# Patient Record
Sex: MALE | Race: WHITE | Employment: OTHER | URBAN - METROPOLITAN AREA
[De-identification: names, ages, dates, MRNs, and addresses within clinical notes are randomized per-mention and may not be internally consistent; named-entity substitution may affect disease eponyms.]

---

## 2017-03-10 ENCOUNTER — HOSPITAL ENCOUNTER (OUTPATIENT)
Dept: OTHER | Age: 72
Discharge: HOME | End: 2017-03-10
Attending: FAMILY MEDICINE | Admitting: FAMILY MEDICINE

## 2017-03-10 LAB
A/G RATIO: 1.6 (ref 1.1–2.2)
ALBUMIN SERPL-MCNC: 4.1 G/DL (ref 3.4–5)
ALP BLD-CCNC: 50 U/L (ref 40–129)
ALT SERPL-CCNC: 9 U/L (ref 10–40)
ANION GAP SERPL CALCULATED.3IONS-SCNC: 13 MMOL/L (ref 3–16)
AST SERPL-CCNC: 14 U/L (ref 15–37)
BILIRUB SERPL-MCNC: 0.6 MG/DL (ref 0–1)
BUN BLDV-MCNC: 9 MG/DL (ref 7–20)
CALCIUM SERPL-MCNC: 8.8 MG/DL (ref 8.3–10.6)
CHLORIDE BLD-SCNC: 95 MMOL/L (ref 99–110)
CHOLESTEROL, TOTAL: 171 MG/DL (ref 0–199)
CO2: 26 MMOL/L (ref 21–32)
CREAT SERPL-MCNC: 0.7 MG/DL (ref 0.8–1.3)
GFR AFRICAN AMERICAN: >60
GFR NON-AFRICAN AMERICAN: >60
GLOBULIN: 2.5 G/DL
GLUCOSE BLD-MCNC: 91 MG/DL (ref 70–99)
HDLC SERPL-MCNC: 57 MG/DL (ref 40–60)
LDL CHOLESTEROL CALCULATED: 102 MG/DL
POTASSIUM SERPL-SCNC: 4 MMOL/L (ref 3.5–5.1)
SODIUM BLD-SCNC: 134 MMOL/L (ref 136–145)
TOTAL PROTEIN: 6.6 G/DL (ref 6.4–8.2)
TRIGL SERPL-MCNC: 61 MG/DL (ref 0–150)
VLDLC SERPL CALC-MCNC: 12 MG/DL

## 2017-03-20 ENCOUNTER — OFFICE VISIT (OUTPATIENT)
Dept: FAMILY MEDICINE CLINIC | Age: 72
End: 2017-03-20

## 2017-03-20 VITALS
BODY MASS INDEX: 30.87 KG/M2 | RESPIRATION RATE: 12 BRPM | WEIGHT: 215.13 LBS | SYSTOLIC BLOOD PRESSURE: 134 MMHG | OXYGEN SATURATION: 98 % | HEART RATE: 87 BPM | DIASTOLIC BLOOD PRESSURE: 72 MMHG

## 2017-03-20 DIAGNOSIS — I10 ESSENTIAL HYPERTENSION: ICD-10-CM

## 2017-03-20 DIAGNOSIS — G45.9 TRANSIENT CEREBRAL ISCHEMIA, UNSPECIFIED TYPE: ICD-10-CM

## 2017-03-20 DIAGNOSIS — N40.1 BENIGN NON-NODULAR PROSTATIC HYPERPLASIA WITH LOWER URINARY TRACT SYMPTOMS: ICD-10-CM

## 2017-03-20 DIAGNOSIS — E78.00 HYPERCHOLESTEROLEMIA: Primary | ICD-10-CM

## 2017-03-20 PROCEDURE — 1123F ACP DISCUSS/DSCN MKR DOCD: CPT | Performed by: FAMILY MEDICINE

## 2017-03-20 PROCEDURE — G8417 CALC BMI ABV UP PARAM F/U: HCPCS | Performed by: FAMILY MEDICINE

## 2017-03-20 PROCEDURE — 99214 OFFICE O/P EST MOD 30 MIN: CPT | Performed by: FAMILY MEDICINE

## 2017-03-20 PROCEDURE — 3017F COLORECTAL CA SCREEN DOC REV: CPT | Performed by: FAMILY MEDICINE

## 2017-03-20 PROCEDURE — 4040F PNEUMOC VAC/ADMIN/RCVD: CPT | Performed by: FAMILY MEDICINE

## 2017-03-20 PROCEDURE — G8427 DOCREV CUR MEDS BY ELIG CLIN: HCPCS | Performed by: FAMILY MEDICINE

## 2017-03-20 PROCEDURE — G8484 FLU IMMUNIZE NO ADMIN: HCPCS | Performed by: FAMILY MEDICINE

## 2017-03-20 PROCEDURE — 1036F TOBACCO NON-USER: CPT | Performed by: FAMILY MEDICINE

## 2017-03-20 RX ORDER — TRIAMTERENE AND HYDROCHLOROTHIAZIDE 37.5; 25 MG/1; MG/1
1 TABLET ORAL DAILY
Qty: 90 TABLET | Refills: 3 | Status: SHIPPED | OUTPATIENT
Start: 2017-03-20 | End: 2018-03-27 | Stop reason: SDUPTHER

## 2017-03-20 RX ORDER — DOXAZOSIN 2 MG/1
2 TABLET ORAL DAILY
Qty: 90 TABLET | Refills: 3 | Status: SHIPPED | OUTPATIENT
Start: 2017-03-20 | End: 2018-03-27 | Stop reason: SDUPTHER

## 2017-03-20 RX ORDER — LISINOPRIL 2.5 MG/1
2.5 TABLET ORAL DAILY
Qty: 90 TABLET | Refills: 3 | Status: SHIPPED | OUTPATIENT
Start: 2017-03-20 | End: 2018-03-27 | Stop reason: SDUPTHER

## 2017-03-22 ENCOUNTER — OFFICE VISIT (OUTPATIENT)
Dept: FAMILY MEDICINE CLINIC | Age: 72
End: 2017-03-22

## 2017-03-22 VITALS
SYSTOLIC BLOOD PRESSURE: 130 MMHG | BODY MASS INDEX: 30.85 KG/M2 | DIASTOLIC BLOOD PRESSURE: 80 MMHG | WEIGHT: 215 LBS | TEMPERATURE: 98.1 F

## 2017-03-22 DIAGNOSIS — R59.1 LYMPHADENOPATHY OF HEAD AND NECK: Primary | ICD-10-CM

## 2017-03-22 PROCEDURE — 3017F COLORECTAL CA SCREEN DOC REV: CPT | Performed by: PHYSICIAN ASSISTANT

## 2017-03-22 PROCEDURE — 1123F ACP DISCUSS/DSCN MKR DOCD: CPT | Performed by: PHYSICIAN ASSISTANT

## 2017-03-22 PROCEDURE — 1036F TOBACCO NON-USER: CPT | Performed by: PHYSICIAN ASSISTANT

## 2017-03-22 PROCEDURE — 99213 OFFICE O/P EST LOW 20 MIN: CPT | Performed by: PHYSICIAN ASSISTANT

## 2017-03-22 PROCEDURE — G8427 DOCREV CUR MEDS BY ELIG CLIN: HCPCS | Performed by: PHYSICIAN ASSISTANT

## 2017-03-22 PROCEDURE — G8484 FLU IMMUNIZE NO ADMIN: HCPCS | Performed by: PHYSICIAN ASSISTANT

## 2017-03-22 PROCEDURE — G8417 CALC BMI ABV UP PARAM F/U: HCPCS | Performed by: PHYSICIAN ASSISTANT

## 2017-03-22 PROCEDURE — 4040F PNEUMOC VAC/ADMIN/RCVD: CPT | Performed by: PHYSICIAN ASSISTANT

## 2017-03-22 RX ORDER — AZITHROMYCIN 250 MG/1
TABLET, FILM COATED ORAL
Qty: 1 PACKET | Refills: 0 | Status: SHIPPED | OUTPATIENT
Start: 2017-03-22 | End: 2017-12-29 | Stop reason: SDUPTHER

## 2017-03-22 ASSESSMENT — ENCOUNTER SYMPTOMS
WHEEZING: 0
SHORTNESS OF BREATH: 0
SORE THROAT: 0
COUGH: 0

## 2017-09-13 ENCOUNTER — HOSPITAL ENCOUNTER (OUTPATIENT)
Dept: OTHER | Age: 72
Discharge: HOME | End: 2017-09-13
Attending: FAMILY MEDICINE | Admitting: FAMILY MEDICINE

## 2017-09-13 LAB
A/G RATIO: 1.3 (ref 1.1–2.2)
ALBUMIN SERPL-MCNC: 3.7 G/DL (ref 3.4–5)
ALP BLD-CCNC: 43 U/L (ref 40–129)
ALT SERPL-CCNC: 9 U/L (ref 10–40)
ANION GAP SERPL CALCULATED.3IONS-SCNC: 14 MMOL/L (ref 3–16)
AST SERPL-CCNC: 14 U/L (ref 15–37)
BILIRUB SERPL-MCNC: 0.5 MG/DL (ref 0–1)
BUN BLDV-MCNC: 10 MG/DL (ref 7–20)
CALCIUM SERPL-MCNC: 8.7 MG/DL (ref 8.3–10.6)
CHLORIDE BLD-SCNC: 98 MMOL/L (ref 99–110)
CHOLESTEROL, TOTAL: 168 MG/DL (ref 0–199)
CO2: 24 MMOL/L (ref 21–32)
CREAT SERPL-MCNC: 0.7 MG/DL (ref 0.8–1.3)
GFR AFRICAN AMERICAN: >60
GFR NON-AFRICAN AMERICAN: >60
GLOBULIN: 2.8 G/DL
GLUCOSE BLD-MCNC: 92 MG/DL (ref 70–99)
HDLC SERPL-MCNC: 54 MG/DL (ref 40–60)
LDL CHOLESTEROL CALCULATED: 98 MG/DL
POTASSIUM SERPL-SCNC: 4 MMOL/L (ref 3.5–5.1)
PROSTATE SPECIFIC ANTIGEN: 0.56 NG/ML (ref 0–4)
SODIUM BLD-SCNC: 136 MMOL/L (ref 136–145)
TOTAL PROTEIN: 6.5 G/DL (ref 6.4–8.2)
TRIGL SERPL-MCNC: 78 MG/DL (ref 0–150)
VLDLC SERPL CALC-MCNC: 16 MG/DL

## 2017-09-27 ENCOUNTER — OFFICE VISIT (OUTPATIENT)
Dept: FAMILY MEDICINE CLINIC | Age: 72
End: 2017-09-27

## 2017-09-27 VITALS
HEART RATE: 71 BPM | HEIGHT: 71 IN | DIASTOLIC BLOOD PRESSURE: 84 MMHG | SYSTOLIC BLOOD PRESSURE: 124 MMHG | OXYGEN SATURATION: 98 % | RESPIRATION RATE: 12 BRPM | BODY MASS INDEX: 29.31 KG/M2 | WEIGHT: 209.38 LBS

## 2017-09-27 DIAGNOSIS — G45.9 TRANSIENT CEREBRAL ISCHEMIA, UNSPECIFIED TYPE: ICD-10-CM

## 2017-09-27 DIAGNOSIS — E78.00 HYPERCHOLESTEROLEMIA: ICD-10-CM

## 2017-09-27 DIAGNOSIS — Z23 NEED FOR INFLUENZA VACCINATION: ICD-10-CM

## 2017-09-27 DIAGNOSIS — M15.9 PRIMARY OSTEOARTHRITIS INVOLVING MULTIPLE JOINTS: ICD-10-CM

## 2017-09-27 DIAGNOSIS — I10 ESSENTIAL HYPERTENSION: Primary | ICD-10-CM

## 2017-09-27 PROCEDURE — 99214 OFFICE O/P EST MOD 30 MIN: CPT | Performed by: FAMILY MEDICINE

## 2017-09-27 PROCEDURE — 3017F COLORECTAL CA SCREEN DOC REV: CPT | Performed by: FAMILY MEDICINE

## 2017-09-27 PROCEDURE — G8419 CALC BMI OUT NRM PARAM NOF/U: HCPCS | Performed by: FAMILY MEDICINE

## 2017-09-27 PROCEDURE — G0008 ADMIN INFLUENZA VIRUS VAC: HCPCS | Performed by: FAMILY MEDICINE

## 2017-09-27 PROCEDURE — 4040F PNEUMOC VAC/ADMIN/RCVD: CPT | Performed by: FAMILY MEDICINE

## 2017-09-27 PROCEDURE — 1036F TOBACCO NON-USER: CPT | Performed by: FAMILY MEDICINE

## 2017-09-27 PROCEDURE — 90662 IIV NO PRSV INCREASED AG IM: CPT | Performed by: FAMILY MEDICINE

## 2017-09-27 PROCEDURE — 1123F ACP DISCUSS/DSCN MKR DOCD: CPT | Performed by: FAMILY MEDICINE

## 2017-09-27 PROCEDURE — G8427 DOCREV CUR MEDS BY ELIG CLIN: HCPCS | Performed by: FAMILY MEDICINE

## 2017-09-27 ASSESSMENT — PATIENT HEALTH QUESTIONNAIRE - PHQ9
SUM OF ALL RESPONSES TO PHQ QUESTIONS 1-9: 0
SUM OF ALL RESPONSES TO PHQ9 QUESTIONS 1 & 2: 0
1. LITTLE INTEREST OR PLEASURE IN DOING THINGS: 0
2. FEELING DOWN, DEPRESSED OR HOPELESS: 0

## 2017-09-27 NOTE — PROGRESS NOTES
Subjective:      Patient ID: Enio Barclay is a 71 y.o. male. HPI Patient presents for re-evaluation of chronic health problems. Pt states he has no concerns at this time. Patient's back pain is relatively unchanged. He has good days and bad days. He has noted particular with standing with his back bent that he will have discomfort. He typically doesn't take any medications for these problems. Patient denies any new neurologic changes. Patient is very compliant with medications with no adverse reactions. Patient denies any chest pain or shortness of breath symptoms. Urinary symptoms are controlled with having nocturia x1    Review of Systems  Patient Active Problem List   Diagnosis    TIA (transient ischemic attack)    Hypertension    Benign non-nodular prostatic hyperplasia with lower urinary tract symptoms    Hypercholesterolemia       Outpatient Prescriptions Marked as Taking for the 9/27/17 encounter (Office Visit) with Samreen Madsen MD   Medication Sig Dispense Refill    lisinopril (PRINIVIL;ZESTRIL) 2.5 MG tablet Take 1 tablet by mouth daily 90 tablet 3    triamterene-hydrochlorothiazide (MAXZIDE-25) 37.5-25 MG per tablet Take 1 tablet by mouth daily 90 tablet 3    doxazosin (CARDURA) 2 MG tablet Take 1 tablet by mouth daily 90 tablet 3    Red Yeast Rice Extract 600 MG CAPS Take 2 capsules by mouth daily.  aspirin 81 MG EC tablet Take 81 mg by mouth daily.            Allergies   Allergen Reactions    Shellfish-Derived Products Swelling     CRAB       Social History   Substance Use Topics    Smoking status: Former Smoker     Packs/day: 0.50     Years: 50.00    Smokeless tobacco: Never Used    Alcohol use 25.2 oz/week     42 Cans of beer per week      Comment: 6 beers daily       /84 (Site: Right Arm, Position: Sitting, Cuff Size: Medium Adult)  Pulse 71  Resp 12  Ht 5' 10.5\" (1.791 m)  Wt 209 lb 6 oz (95 kg)  SpO2 98%  BMI 29.62 kg/m2    Objective:   Physical Exam regard to his osteoarthritis of spine clinically    Patient received counseling on the following healthy behaviors: nutrition and exercise     Patient given educational materials     Discussed use, benefit, and side effects of prescribed medications. Barriers to medication compliance addressed. All patient questions answered. Pt voiced understanding. Patient needs RTC in 6 months. Please note that this chart was generated using Dragon dictation software. Although every effort was made to ensure the accuracy of this automated transcription, some errors in transcription may have occurred.

## 2017-09-27 NOTE — MR AVS SNAPSHOT
type 2 diabetes, stroke, gallstones, arthritis, sleep apnea, and certain cancers. BMI is not perfect. It may overestimate body fat in athletes and people who are more muscular. If your body fat is high you can improve your BMI by decreasing your calorie intake and becoming more physically active. Learn more at: [employer]             Medications and Orders      Your Current Medications Are              lisinopril (PRINIVIL;ZESTRIL) 2.5 MG tablet Take 1 tablet by mouth daily    triamterene-hydrochlorothiazide (MAXZIDE-25) 37.5-25 MG per tablet Take 1 tablet by mouth daily    doxazosin (CARDURA) 2 MG tablet Take 1 tablet by mouth daily    Red Yeast Rice Extract 600 MG CAPS Take 2 capsules by mouth daily. aspirin 81 MG EC tablet Take 81 mg by mouth daily.         Allergies              Shellfish-derived Products Swelling    CRAB         Additional Information        Basic Information     Date Of Birth Sex Race Ethnicity Preferred Language    1945 Male White Non-/Non  English      Problem List as of 9/27/2017  Date Reviewed: 3/22/2017                Hypercholesterolemia    Benign non-nodular prostatic hyperplasia with lower urinary tract symptoms    TIA (transient ischemic attack)    Hypertension      Immunizations as of 9/27/2017     Name Date    Influenza Vaccine, unspecified formulation 9/30/2015, 11/18/2014    Influenza Virus Vaccine 9/10/2012    Influenza, High Dose 9/21/2016, 9/30/2015, 9/25/2013    Pneumococcal 13-valent Conjugate (Chpvjwe79) 3/16/2015    Pneumococcal Polysaccharide (Rjezifool21) 3/25/2014    Tdap (Boostrix, Adacel) 3/8/2010      Preventive Care        Date Due    One-time abdominal aortic aneurism (AAA) screening is recommended for all men between the age of 73-68 who have ever smoked 1945    Hepatitis C screening is recommended for all adults regardless of risk

## 2017-10-06 PROBLEM — M15.0 PRIMARY OSTEOARTHRITIS INVOLVING MULTIPLE JOINTS: Status: ACTIVE | Noted: 2017-10-06

## 2017-10-06 PROBLEM — M15.9 PRIMARY OSTEOARTHRITIS INVOLVING MULTIPLE JOINTS: Status: ACTIVE | Noted: 2017-10-06

## 2017-12-29 ENCOUNTER — OFFICE VISIT (OUTPATIENT)
Dept: FAMILY MEDICINE CLINIC | Age: 72
End: 2017-12-29

## 2017-12-29 VITALS
SYSTOLIC BLOOD PRESSURE: 120 MMHG | TEMPERATURE: 98.6 F | DIASTOLIC BLOOD PRESSURE: 82 MMHG | OXYGEN SATURATION: 99 % | HEART RATE: 86 BPM

## 2017-12-29 DIAGNOSIS — J20.9 ACUTE BRONCHITIS, UNSPECIFIED ORGANISM: Primary | ICD-10-CM

## 2017-12-29 PROCEDURE — 1036F TOBACCO NON-USER: CPT | Performed by: FAMILY MEDICINE

## 2017-12-29 PROCEDURE — G8484 FLU IMMUNIZE NO ADMIN: HCPCS | Performed by: FAMILY MEDICINE

## 2017-12-29 PROCEDURE — G8428 CUR MEDS NOT DOCUMENT: HCPCS | Performed by: FAMILY MEDICINE

## 2017-12-29 PROCEDURE — 1123F ACP DISCUSS/DSCN MKR DOCD: CPT | Performed by: FAMILY MEDICINE

## 2017-12-29 PROCEDURE — 4040F PNEUMOC VAC/ADMIN/RCVD: CPT | Performed by: FAMILY MEDICINE

## 2017-12-29 PROCEDURE — 3017F COLORECTAL CA SCREEN DOC REV: CPT | Performed by: FAMILY MEDICINE

## 2017-12-29 PROCEDURE — G8419 CALC BMI OUT NRM PARAM NOF/U: HCPCS | Performed by: FAMILY MEDICINE

## 2017-12-29 PROCEDURE — 99213 OFFICE O/P EST LOW 20 MIN: CPT | Performed by: FAMILY MEDICINE

## 2017-12-29 RX ORDER — AZITHROMYCIN 250 MG/1
TABLET, FILM COATED ORAL
Qty: 1 PACKET | Refills: 0 | Status: SHIPPED | OUTPATIENT
Start: 2017-12-29 | End: 2018-06-01 | Stop reason: SDUPTHER

## 2017-12-29 ASSESSMENT — ENCOUNTER SYMPTOMS
RHINORRHEA: 1
COUGH: 1
SORE THROAT: 1

## 2017-12-29 NOTE — PROGRESS NOTES
Subjective:      Patient ID: Rose Marie Beatty is a 71 y.o. male. Patient presents for acute medical problem. Medical assistant notes reviewed. URI    This is a new problem. Episode onset: about 8 days. There has been no fever. Associated symptoms include congestion, coughing, rhinorrhea, sneezing and a sore throat. Associated symptoms comments: Hoarseness, post nasal drip. Treatments tried: Coricidin HBP. Patient feels should be getting better and he has not. A lot congestion and drainage but is been having a lot of cough that keeps him awake at nighttime. He does use humidifier. Review of Systems   HENT: Positive for congestion, rhinorrhea, sneezing and sore throat. Respiratory: Positive for cough. Allergies   Allergen Reactions    Shellfish-Derived Products Swelling     CRAB         Objective:   Physical Exam   Constitutional: He appears well-developed and well-nourished. He is cooperative. HENT:   Right Ear: Tympanic membrane and ear canal normal.   Left Ear: Tympanic membrane and ear canal normal.   Nose: Mucosal edema and rhinorrhea present. Right sinus exhibits no maxillary sinus tenderness and no frontal sinus tenderness. Left sinus exhibits no maxillary sinus tenderness and no frontal sinus tenderness. Mouth/Throat: Oropharynx is clear and moist and mucous membranes are normal.   Neck: Neck supple. Pulmonary/Chest: Effort normal. No respiratory distress. He has no decreased breath sounds. He has no wheezes. He has rhonchi (diffuse). Lymphadenopathy:     He has no cervical adenopathy. Neurological: He is alert. Assessment:      Santo Jain was seen today for uri. Diagnoses and all orders for this visit:    Acute bronchitis, unspecified organism    Other orders  -     azithromycin (ZITHROMAX) 250 MG tablet; Take 2 tabs (500 mg) on Day 1, and take 1 tab (250 mg) on days 2 through 5. Plan:      Humidification of the bedroom was discussed.   RTC when necessary

## 2018-03-09 ENCOUNTER — TELEPHONE (OUTPATIENT)
Dept: FAMILY MEDICINE CLINIC | Age: 73
End: 2018-03-09

## 2018-03-09 DIAGNOSIS — N40.1 BENIGN NON-NODULAR PROSTATIC HYPERPLASIA WITH LOWER URINARY TRACT SYMPTOMS: Primary | ICD-10-CM

## 2018-03-09 DIAGNOSIS — I10 ESSENTIAL HYPERTENSION: ICD-10-CM

## 2018-03-09 NOTE — TELEPHONE ENCOUNTER
Lab orders requested- 6 mth HTN R/CK    Physical Scheduled Date: 3/27/18     Last Physical Date: 3-16-15 (physical) LOV- 12-29-17- same day    Physician Scheduled with: Dr. Phong Alford    Patient will be using [facility] Lab : place orders in chart-    No: fax orders to:N/A patient will  orders YES- Call Pt when ready please    Be sure to tell the patient to fast 10 to 12 hours before having their blood draw. ALSO    FYI to MD- Pt received Call on AWV- Pt has declined to have this visit due to upcoming visit on 3/27.

## 2018-03-12 NOTE — TELEPHONE ENCOUNTER
[facility]'t [facility] to  lab orders here. Lab orders put in Epic.

## 2018-03-19 ENCOUNTER — HOSPITAL ENCOUNTER (OUTPATIENT)
Dept: OTHER | Age: 73
Discharge: HOME | End: 2018-03-19
Attending: FAMILY MEDICINE | Admitting: FAMILY MEDICINE

## 2018-03-19 DIAGNOSIS — I10 ESSENTIAL HYPERTENSION: ICD-10-CM

## 2018-03-19 DIAGNOSIS — N40.1 BENIGN NON-NODULAR PROSTATIC HYPERPLASIA WITH LOWER URINARY TRACT SYMPTOMS: ICD-10-CM

## 2018-03-19 LAB
A/G RATIO: 1.6 (ref 1.1–2.2)
ALBUMIN SERPL-MCNC: 3.9 G/DL (ref 3.4–5)
ALP BLD-CCNC: 43 U/L (ref 40–129)
ALT SERPL-CCNC: 11 U/L (ref 10–40)
ANION GAP SERPL CALCULATED.3IONS-SCNC: 11 MMOL/L (ref 3–16)
AST SERPL-CCNC: 23 U/L (ref 15–37)
BILIRUB SERPL-MCNC: 0.4 MG/DL (ref 0–1)
BUN BLDV-MCNC: 9 MG/DL (ref 7–20)
CALCIUM SERPL-MCNC: 8.2 MG/DL (ref 8.3–10.6)
CHLORIDE BLD-SCNC: 99 MMOL/L (ref 99–110)
CHOLESTEROL, TOTAL: 157 MG/DL (ref 0–199)
CO2: 27 MMOL/L (ref 21–32)
CREAT SERPL-MCNC: 0.6 MG/DL (ref 0.8–1.3)
GFR AFRICAN AMERICAN: >60
GFR NON-AFRICAN AMERICAN: >60
GLOBULIN: 2.5 G/DL
GLUCOSE BLD-MCNC: 95 MG/DL (ref 70–99)
HDLC SERPL-MCNC: 67 MG/DL (ref 40–60)
LDL CHOLESTEROL CALCULATED: 81 MG/DL
POTASSIUM SERPL-SCNC: 4.5 MMOL/L (ref 3.5–5.1)
PROSTATE SPECIFIC ANTIGEN: 0.66 NG/ML (ref 0–4)
SODIUM BLD-SCNC: 137 MMOL/L (ref 136–145)
TOTAL PROTEIN: 6.4 G/DL (ref 6.4–8.2)
TRIGL SERPL-MCNC: 44 MG/DL (ref 0–150)
VLDLC SERPL CALC-MCNC: 9 MG/DL

## 2018-03-27 ENCOUNTER — OFFICE VISIT (OUTPATIENT)
Dept: FAMILY MEDICINE CLINIC | Age: 73
End: 2018-03-27

## 2018-03-27 VITALS
HEIGHT: 71 IN | BODY MASS INDEX: 29.56 KG/M2 | OXYGEN SATURATION: 98 % | WEIGHT: 211.13 LBS | HEART RATE: 82 BPM | RESPIRATION RATE: 12 BRPM | SYSTOLIC BLOOD PRESSURE: 130 MMHG | DIASTOLIC BLOOD PRESSURE: 74 MMHG

## 2018-03-27 DIAGNOSIS — E78.00 HYPERCHOLESTEROLEMIA: ICD-10-CM

## 2018-03-27 DIAGNOSIS — N40.1 BENIGN NON-NODULAR PROSTATIC HYPERPLASIA WITH LOWER URINARY TRACT SYMPTOMS: ICD-10-CM

## 2018-03-27 DIAGNOSIS — M15.9 PRIMARY OSTEOARTHRITIS INVOLVING MULTIPLE JOINTS: ICD-10-CM

## 2018-03-27 DIAGNOSIS — G45.9 TRANSIENT CEREBRAL ISCHEMIA, UNSPECIFIED TYPE: ICD-10-CM

## 2018-03-27 DIAGNOSIS — I10 ESSENTIAL HYPERTENSION: Primary | ICD-10-CM

## 2018-03-27 PROCEDURE — 99214 OFFICE O/P EST MOD 30 MIN: CPT | Performed by: FAMILY MEDICINE

## 2018-03-27 PROCEDURE — 1036F TOBACCO NON-USER: CPT | Performed by: FAMILY MEDICINE

## 2018-03-27 PROCEDURE — 3017F COLORECTAL CA SCREEN DOC REV: CPT | Performed by: FAMILY MEDICINE

## 2018-03-27 PROCEDURE — G8427 DOCREV CUR MEDS BY ELIG CLIN: HCPCS | Performed by: FAMILY MEDICINE

## 2018-03-27 PROCEDURE — G8419 CALC BMI OUT NRM PARAM NOF/U: HCPCS | Performed by: FAMILY MEDICINE

## 2018-03-27 PROCEDURE — G8482 FLU IMMUNIZE ORDER/ADMIN: HCPCS | Performed by: FAMILY MEDICINE

## 2018-03-27 PROCEDURE — 1123F ACP DISCUSS/DSCN MKR DOCD: CPT | Performed by: FAMILY MEDICINE

## 2018-03-27 PROCEDURE — 4040F PNEUMOC VAC/ADMIN/RCVD: CPT | Performed by: FAMILY MEDICINE

## 2018-03-27 RX ORDER — TRIAMTERENE AND HYDROCHLOROTHIAZIDE 37.5; 25 MG/1; MG/1
1 TABLET ORAL DAILY
Qty: 90 TABLET | Refills: 3 | Status: SHIPPED | OUTPATIENT
Start: 2018-03-27 | End: 2019-03-04 | Stop reason: SDUPTHER

## 2018-03-27 RX ORDER — LISINOPRIL 2.5 MG/1
2.5 TABLET ORAL DAILY
Qty: 90 TABLET | Refills: 3 | Status: SHIPPED | OUTPATIENT
Start: 2018-03-27 | End: 2019-03-04 | Stop reason: SDUPTHER

## 2018-03-27 RX ORDER — DOXAZOSIN 2 MG/1
2 TABLET ORAL DAILY
Qty: 90 TABLET | Refills: 3 | Status: SHIPPED | OUTPATIENT
Start: 2018-03-27 | End: 2019-03-04 | Stop reason: SDUPTHER

## 2018-03-27 NOTE — PROGRESS NOTES
BMI 29.87 kg/m²     Objective:   Physical Exam    Assessment:      Iman Lucas was seen today for follow-up. Diagnoses and all orders for this visit:    Essential hypertension  -     lisinopril (PRINIVIL;ZESTRIL) 2.5 MG tablet; Take 1 tablet by mouth daily  -     triamterene-hydrochlorothiazide (MAXZIDE-25) 37.5-25 MG per tablet; Take 1 tablet by mouth daily    Transient cerebral ischemia, unspecified type    Benign non-nodular prostatic hyperplasia with lower urinary tract symptoms    Hypercholesterolemia    Primary osteoarthritis involving multiple joints    Other orders  -     doxazosin (CARDURA) 2 MG tablet; Take 1 tablet by mouth daily            Plan:      Pt appears stable & reviewed labs with patient. Patient does not want to be on statin medications. No change her current medication. Patient received counseling on the following healthy behaviors: nutrition and exercise     Patient given educational materials     Health maintenance updated    Discussed use, benefit, and side effects of prescribed medications. Barriers to medication compliance addressed. All patient questions answered. Pt voiced understanding. Patient needs RTC in 6 months. Please note that this chart was generated using Dragon dictation software. Although every effort was made to ensure the accuracy of this automated transcription, some errors in transcription may have occurred.

## 2018-06-01 ENCOUNTER — OFFICE VISIT (OUTPATIENT)
Dept: FAMILY MEDICINE CLINIC | Age: 73
End: 2018-06-01

## 2018-06-01 VITALS
WEIGHT: 206.8 LBS | DIASTOLIC BLOOD PRESSURE: 80 MMHG | HEART RATE: 85 BPM | OXYGEN SATURATION: 98 % | BODY MASS INDEX: 29.25 KG/M2 | SYSTOLIC BLOOD PRESSURE: 126 MMHG | TEMPERATURE: 97.9 F

## 2018-06-01 DIAGNOSIS — J20.9 ACUTE BRONCHITIS, UNSPECIFIED ORGANISM: ICD-10-CM

## 2018-06-01 DIAGNOSIS — H66.012 ACUTE SUPPURATIVE OTITIS MEDIA OF LEFT EAR WITH SPONTANEOUS RUPTURE OF TYMPANIC MEMBRANE, RECURRENCE NOT SPECIFIED: Primary | ICD-10-CM

## 2018-06-01 PROCEDURE — 99213 OFFICE O/P EST LOW 20 MIN: CPT | Performed by: FAMILY MEDICINE

## 2018-06-01 PROCEDURE — G8419 CALC BMI OUT NRM PARAM NOF/U: HCPCS | Performed by: FAMILY MEDICINE

## 2018-06-01 PROCEDURE — 3017F COLORECTAL CA SCREEN DOC REV: CPT | Performed by: FAMILY MEDICINE

## 2018-06-01 PROCEDURE — 4040F PNEUMOC VAC/ADMIN/RCVD: CPT | Performed by: FAMILY MEDICINE

## 2018-06-01 PROCEDURE — 1036F TOBACCO NON-USER: CPT | Performed by: FAMILY MEDICINE

## 2018-06-01 PROCEDURE — G8428 CUR MEDS NOT DOCUMENT: HCPCS | Performed by: FAMILY MEDICINE

## 2018-06-01 PROCEDURE — 1123F ACP DISCUSS/DSCN MKR DOCD: CPT | Performed by: FAMILY MEDICINE

## 2018-06-01 RX ORDER — AZITHROMYCIN 250 MG/1
TABLET, FILM COATED ORAL
Qty: 1 PACKET | Refills: 0 | Status: SHIPPED | OUTPATIENT
Start: 2018-06-01 | End: 2018-06-11

## 2018-06-07 ENCOUNTER — TELEPHONE (OUTPATIENT)
Dept: FAMILY MEDICINE CLINIC | Age: 73
End: 2018-06-07

## 2018-06-14 ENCOUNTER — OFFICE VISIT (OUTPATIENT)
Dept: FAMILY MEDICINE CLINIC | Age: 73
End: 2018-06-14

## 2018-06-14 VITALS
DIASTOLIC BLOOD PRESSURE: 80 MMHG | BODY MASS INDEX: 29.23 KG/M2 | SYSTOLIC BLOOD PRESSURE: 122 MMHG | HEART RATE: 79 BPM | OXYGEN SATURATION: 98 % | WEIGHT: 206.6 LBS

## 2018-06-14 DIAGNOSIS — H66.012 ACUTE SUPPURATIVE OTITIS MEDIA OF LEFT EAR WITH SPONTANEOUS RUPTURE OF TYMPANIC MEMBRANE, RECURRENCE NOT SPECIFIED: Primary | ICD-10-CM

## 2018-06-14 PROCEDURE — G8419 CALC BMI OUT NRM PARAM NOF/U: HCPCS | Performed by: FAMILY MEDICINE

## 2018-06-14 PROCEDURE — 3017F COLORECTAL CA SCREEN DOC REV: CPT | Performed by: FAMILY MEDICINE

## 2018-06-14 PROCEDURE — G8428 CUR MEDS NOT DOCUMENT: HCPCS | Performed by: FAMILY MEDICINE

## 2018-06-14 PROCEDURE — 99213 OFFICE O/P EST LOW 20 MIN: CPT | Performed by: FAMILY MEDICINE

## 2018-06-14 PROCEDURE — 1123F ACP DISCUSS/DSCN MKR DOCD: CPT | Performed by: FAMILY MEDICINE

## 2018-06-14 PROCEDURE — 1036F TOBACCO NON-USER: CPT | Performed by: FAMILY MEDICINE

## 2018-06-14 PROCEDURE — 4040F PNEUMOC VAC/ADMIN/RCVD: CPT | Performed by: FAMILY MEDICINE

## 2018-06-14 RX ORDER — LEVOFLOXACIN 500 MG/1
500 TABLET, FILM COATED ORAL DAILY
Qty: 10 TABLET | Refills: 0 | Status: SHIPPED | OUTPATIENT
Start: 2018-06-14 | End: 2018-06-24

## 2018-06-21 ENCOUNTER — TELEPHONE (OUTPATIENT)
Dept: ENT CLINIC | Age: 73
End: 2018-06-21

## 2018-06-22 ENCOUNTER — OFFICE VISIT (OUTPATIENT)
Dept: ENT CLINIC | Age: 73
End: 2018-06-22

## 2018-06-22 VITALS
BODY MASS INDEX: 29.28 KG/M2 | HEART RATE: 71 BPM | DIASTOLIC BLOOD PRESSURE: 75 MMHG | WEIGHT: 207 LBS | SYSTOLIC BLOOD PRESSURE: 123 MMHG

## 2018-06-22 DIAGNOSIS — H91.22 HEARING LOSS, SUDDEN, LEFT: Primary | ICD-10-CM

## 2018-06-22 PROCEDURE — 99203 OFFICE O/P NEW LOW 30 MIN: CPT | Performed by: OTOLARYNGOLOGY

## 2018-06-22 PROCEDURE — 4040F PNEUMOC VAC/ADMIN/RCVD: CPT | Performed by: OTOLARYNGOLOGY

## 2018-06-22 PROCEDURE — 1036F TOBACCO NON-USER: CPT | Performed by: OTOLARYNGOLOGY

## 2018-06-22 PROCEDURE — G8427 DOCREV CUR MEDS BY ELIG CLIN: HCPCS | Performed by: OTOLARYNGOLOGY

## 2018-06-22 PROCEDURE — 1123F ACP DISCUSS/DSCN MKR DOCD: CPT | Performed by: OTOLARYNGOLOGY

## 2018-06-22 PROCEDURE — 3017F COLORECTAL CA SCREEN DOC REV: CPT | Performed by: OTOLARYNGOLOGY

## 2018-06-22 PROCEDURE — G8419 CALC BMI OUT NRM PARAM NOF/U: HCPCS | Performed by: OTOLARYNGOLOGY

## 2018-06-22 NOTE — PROGRESS NOTES
[facility]       NEW PATIENT VISIT    PCP:  Kathie Ruiz MD    REFERRED BY:   Kathie Ruiz MD      CHIEF COMPLAINT:  Chief Complaint   Patient presents with    Ear Problem       HISTORY OF PRESENT ILLNESS:       (Location, Quality, Severity, Duration, Timing, Context, Modifying factors, Associated symptoms)  Jase Mcpherson is a 71 y.o. male here for evaluation of a problem located in the left ear. Sudden hearing loss June 1, or June 2. Hearing loss was severe. Left ear. Saw Dr. Donna Colunga a few days after onset, and when took hearing aid out at the doc office the ear was bleeding and had not bled before that. Dr Donna Colunga treated with a Media Mary and apin stopped. Then still deaf, then treaed with Levofloxacin, 6/14/18. And said if that didn't clear it up need to see ENT. Hearing has gotten a little better but if put hearing aid in and still cant hear anything. Wears hearing aids in both ears since 2003. Has tinnitus. Had hearing test at [facility] in May 2018 and they increased the gain on the hearing aid about 10 dB in May at [facility]:    CONSTITUTIONAL:  Denied fever. Denied unexplained weight loss. EARS, NOSE, THROAT:  Denied otorrhea, otalgia, nasal dyspnea, rhinorrhea, sore throat and   hoarseness. PAST MEDICAL HISTORY:    Past Medical History:   Diagnosis Date    Enlarged prostate     Hypercholesterolemia 9/8/2014    Hypertension     Hypertrophy of prostate without urinary obstruction and other lower urinary tract symptoms (LUTS) 3/5/2013    Irregular heart beat     Tobacco abuse 7/19/2011    Unspecified transient cerebral ischemia        History reviewed. No pertinent surgical history. EXAMINATION:    VITALS SIGNS reviewed.    Vitals:    06/22/18 1358 06/22/18 1417   BP: (!) 143/69 123/75   Site: Left Arm Left Arm   Position: Sitting Sitting   Cuff Size: Medium Adult Medium Adult   Pulse: 75 71   Weight: 207 lb (93.9 goiter bilaterally. LYMPH NODES, CERVICAL, FACIAL AND SUPRACLAVICULAR:  No lymphadenopathy detected bilaterally. IMPRESSION / [facility]:       Madelin Pandya was seen today for ear problem. Diagnoses and all orders for this visit:    Hearing loss, sudden, left  -     Internal Referral to Audiology at [facility]         RECOMMENDATIONS/PLAN:    1.  Audiogram.     2. Return for recheck and follow-up after hearing test.

## 2018-06-26 ENCOUNTER — OFFICE VISIT (OUTPATIENT)
Dept: AUDIOLOGY | Age: 73
End: 2018-06-26

## 2018-06-26 DIAGNOSIS — H90.6 MIXED CONDUCTIVE AND SENSORINEURAL HEARING LOSS OF BOTH EARS: Primary | ICD-10-CM

## 2018-06-26 PROCEDURE — 92550 TYMPANOMETRY & REFLEX THRESH: CPT | Performed by: AUDIOLOGIST

## 2018-06-26 PROCEDURE — 92557 COMPREHENSIVE HEARING TEST: CPT | Performed by: AUDIOLOGIST

## 2018-07-11 ENCOUNTER — OFFICE VISIT (OUTPATIENT)
Dept: ENT CLINIC | Age: 73
End: 2018-07-11

## 2018-07-11 VITALS
WEIGHT: 205 LBS | HEART RATE: 79 BPM | SYSTOLIC BLOOD PRESSURE: 118 MMHG | BODY MASS INDEX: 28.7 KG/M2 | HEIGHT: 71 IN | DIASTOLIC BLOOD PRESSURE: 70 MMHG

## 2018-07-11 DIAGNOSIS — H93.13 SUBJECTIVE TINNITUS OF BOTH EARS: Chronic | ICD-10-CM

## 2018-07-11 DIAGNOSIS — H90.6 MIXED CONDUCTIVE AND SENSORINEURAL HEARING LOSS OF BOTH EARS: Primary | Chronic | ICD-10-CM

## 2018-07-11 DIAGNOSIS — H91.22 HEARING LOSS, SUDDEN, LEFT: ICD-10-CM

## 2018-07-11 PROCEDURE — G8427 DOCREV CUR MEDS BY ELIG CLIN: HCPCS | Performed by: OTOLARYNGOLOGY

## 2018-07-11 PROCEDURE — 1101F PT FALLS ASSESS-DOCD LE1/YR: CPT | Performed by: OTOLARYNGOLOGY

## 2018-07-11 PROCEDURE — G8419 CALC BMI OUT NRM PARAM NOF/U: HCPCS | Performed by: OTOLARYNGOLOGY

## 2018-07-11 PROCEDURE — 1036F TOBACCO NON-USER: CPT | Performed by: OTOLARYNGOLOGY

## 2018-07-11 PROCEDURE — 3017F COLORECTAL CA SCREEN DOC REV: CPT | Performed by: OTOLARYNGOLOGY

## 2018-07-11 PROCEDURE — 4040F PNEUMOC VAC/ADMIN/RCVD: CPT | Performed by: OTOLARYNGOLOGY

## 2018-07-11 PROCEDURE — 99214 OFFICE O/P EST MOD 30 MIN: CPT | Performed by: OTOLARYNGOLOGY

## 2018-07-11 PROCEDURE — 1123F ACP DISCUSS/DSCN MKR DOCD: CPT | Performed by: OTOLARYNGOLOGY

## 2018-09-11 ENCOUNTER — HOSPITAL ENCOUNTER (OUTPATIENT)
Dept: ENDOSCOPY | Age: 73
Discharge: HOME | End: 2018-09-11
Attending: INTERNAL MEDICINE | Admitting: INTERNAL MEDICINE

## 2018-09-11 NOTE — ANESTHESIA PRE-OP
[address] [facility] Anesthesiology  Pre-Anesthesia Evaluation/Consultation       Name:  Tivis Hashimoto                                         Age:  68 y.o. MRN:    [id number]           Procedure (Scheduled): COLONOSCOPY   Surgeon:     Dr. Layman Denver, MD     Allergies   Allergen Reactions    Shellfish-Derived Products Swelling     CRAB     Patient Active Problem List   Diagnosis    TIA (transient ischemic attack)    Hypertension    Benign non-nodular prostatic hyperplasia with lower urinary tract symptoms    Hypercholesterolemia    Primary osteoarthritis involving multiple joints     Past Medical History:   Diagnosis Date    Enlarged prostate     Hypercholesterolemia 9/8/2014    Hypertension     Hypertrophy of prostate without urinary obstruction and other lower urinary tract symptoms (LUTS) 3/5/2013    Irregular heart beat     Tobacco abuse 7/19/2011    Unspecified transient cerebral ischemia      No past surgical history on file. Social History   Substance Use Topics    Smoking status: Former Smoker     Packs/day: 0.50     Years: 50.00     Start date: 1960     Quit date: 9/1/2015    Smokeless tobacco: Never Used    Alcohol use 25.2 oz/week     42 Cans of beer per week      Comment: 6 beers daily       Prior to Admission medications    Medication Sig Start Date End Date Taking? Authorizing Provider   lisinopril (PRINIVIL;ZESTRIL) 2.5 MG tablet Take 1 tablet by mouth daily 3/27/18   Tana Escoto MD   triamterene-hydrochlorothiazide [facility]) 37.5-25 MG per tablet Take 1 tablet by mouth daily 3/27/18   Tana Escoto MD   doxazosin (CARDURA) 2 MG tablet Take 1 tablet by mouth daily 3/27/18   Tana Escoto MD   Red Yeast Rice Extract 600 MG CAPS Take 2 capsules by mouth daily. Historical Provider, MD   aspirin 81 MG EC tablet Take 81 mg by mouth daily.       Historical Provider, MD       Current Outpatient Prescriptions   Medication Sig Dispense Refill    lisinopril (If Applicable)  No results found for: LABABO, LABRH      POCGlucose  No results for input(s): GLUCOSE in the last 72 hours. NPO Status  > 8 hours                       BMI  There is no height or weight on file to calculate BMI. Estimated body mass index is 28.29 kg/m² as calculated from the following:    Height as of 8/30/18: 5' 10.5\" (1.791 m). Weight as of 8/30/18: 200 lb (90.7 kg). Additional Testing (Echo, Stress, ECG, PFTs, etc)        Anesthesia Evaluation    Airway: Mallampati: II  TM distance: >3 FB   Neck ROM: full  Mouth opening: > = 3 FB Dental:          Pulmonary:                              Cardiovascular:    (+) hypertension:, dysrhythmias:,         Rhythm: regular  Rate: normal                    Neuro/Psych:   (+) TIA,             GI/Hepatic/Renal:             Endo/Other:                     Abdominal:           Vascular:                                        Anesthesia Plan      MAC     ASA 2       Induction: intravenous. Anesthetic plan and risks discussed with patient. Plan discussed with CRNA. DOS STAFF ADDENDUM:    Pt seen and examined, chart reviewed (including anesthesia, drug and allergy history). No interval changes to history and physical examination. Anesthetic plan, risks, benefits, alternatives, and personnel involved discussed with patient. Patient verbalized an understanding and agrees to proceed.       Wanda Rivera MD  September 11, 2018  8:27 AM

## 2018-09-11 NOTE — ANESTHESIA POST-OP
[facility] Anesthesiology  Post-Anesthesia Note       Name:  Meri Nino                                         Age:  68 y.o. MRN:  [id number]     Last Vitals & Oxygen Saturation: There were no vitals taken for this visit. No data found. Level of consciousness:  Awake, alert    Respiratory: Respirations easy, no distress. Stable. Cardiovascular: Hemodynamically stable. Hydration: Adequate. PONV: Adequately managed. Post-op pain: Adequately controlled. Post-op assessment: Tolerated anesthetic well without complication. Complications:  None.     Laura Cid MD  September 11, 2018   11:42 AM

## 2018-09-18 ENCOUNTER — HOSPITAL ENCOUNTER (OUTPATIENT)
Dept: OTHER | Age: 73
Discharge: HOME | End: 2018-09-18
Attending: FAMILY MEDICINE | Admitting: FAMILY MEDICINE

## 2018-09-18 LAB
A/G RATIO: 1.6 (ref 1.1–2.2)
ALBUMIN SERPL-MCNC: 4.1 G/DL (ref 3.4–5)
ALP BLD-CCNC: 46 U/L (ref 40–129)
ALT SERPL-CCNC: 7 U/L (ref 10–40)
ANION GAP SERPL CALCULATED.3IONS-SCNC: 11 MMOL/L (ref 3–16)
AST SERPL-CCNC: 18 U/L (ref 15–37)
BILIRUB SERPL-MCNC: 0.5 MG/DL (ref 0–1)
BUN BLDV-MCNC: 7 MG/DL (ref 7–20)
CALCIUM SERPL-MCNC: 8.8 MG/DL (ref 8.3–10.6)
CHLORIDE BLD-SCNC: 99 MMOL/L (ref 99–110)
CHOLESTEROL, TOTAL: 149 MG/DL (ref 0–199)
CO2: 26 MMOL/L (ref 21–32)
CREAT SERPL-MCNC: 0.7 MG/DL (ref 0.8–1.3)
GFR AFRICAN AMERICAN: >60
GFR NON-AFRICAN AMERICAN: >60
GLOBULIN: 2.6 G/DL
GLUCOSE BLD-MCNC: 94 MG/DL (ref 70–99)
POTASSIUM SERPL-SCNC: 4.3 MMOL/L (ref 3.5–5.1)
SODIUM BLD-SCNC: 136 MMOL/L (ref 136–145)
TOTAL PROTEIN: 6.7 G/DL (ref 6.4–8.2)

## 2018-10-01 ENCOUNTER — OFFICE VISIT (OUTPATIENT)
Dept: FAMILY MEDICINE CLINIC | Age: 73
End: 2018-10-01
Payer: MEDICARE

## 2018-10-01 VITALS
BODY MASS INDEX: 29.14 KG/M2 | OXYGEN SATURATION: 97 % | DIASTOLIC BLOOD PRESSURE: 72 MMHG | SYSTOLIC BLOOD PRESSURE: 124 MMHG | WEIGHT: 206 LBS | HEART RATE: 81 BPM | RESPIRATION RATE: 12 BRPM

## 2018-10-01 DIAGNOSIS — G45.9 TIA (TRANSIENT ISCHEMIC ATTACK): ICD-10-CM

## 2018-10-01 DIAGNOSIS — E78.00 HYPERCHOLESTEROLEMIA: ICD-10-CM

## 2018-10-01 DIAGNOSIS — H90.3 SENSORINEURAL HEARING LOSS (SNHL) OF BOTH EARS: ICD-10-CM

## 2018-10-01 DIAGNOSIS — L57.0 ACTINIC KERATOSIS: ICD-10-CM

## 2018-10-01 DIAGNOSIS — I10 ESSENTIAL HYPERTENSION: Primary | ICD-10-CM

## 2018-10-01 DIAGNOSIS — Z23 NEED FOR INFLUENZA VACCINATION: ICD-10-CM

## 2018-10-01 DIAGNOSIS — M15.9 PRIMARY OSTEOARTHRITIS INVOLVING MULTIPLE JOINTS: ICD-10-CM

## 2018-10-01 PROCEDURE — 99214 OFFICE O/P EST MOD 30 MIN: CPT | Performed by: FAMILY MEDICINE

## 2018-10-01 PROCEDURE — 4040F PNEUMOC VAC/ADMIN/RCVD: CPT | Performed by: FAMILY MEDICINE

## 2018-10-01 PROCEDURE — 3017F COLORECTAL CA SCREEN DOC REV: CPT | Performed by: FAMILY MEDICINE

## 2018-10-01 PROCEDURE — 17003 DESTRUCT PREMALG LES 2-14: CPT | Performed by: FAMILY MEDICINE

## 2018-10-01 PROCEDURE — 90662 IIV NO PRSV INCREASED AG IM: CPT | Performed by: FAMILY MEDICINE

## 2018-10-01 PROCEDURE — G8419 CALC BMI OUT NRM PARAM NOF/U: HCPCS | Performed by: FAMILY MEDICINE

## 2018-10-01 PROCEDURE — G0008 ADMIN INFLUENZA VIRUS VAC: HCPCS | Performed by: FAMILY MEDICINE

## 2018-10-01 PROCEDURE — G8482 FLU IMMUNIZE ORDER/ADMIN: HCPCS | Performed by: FAMILY MEDICINE

## 2018-10-01 PROCEDURE — 1123F ACP DISCUSS/DSCN MKR DOCD: CPT | Performed by: FAMILY MEDICINE

## 2018-10-01 PROCEDURE — 1101F PT FALLS ASSESS-DOCD LE1/YR: CPT | Performed by: FAMILY MEDICINE

## 2018-10-01 PROCEDURE — 1036F TOBACCO NON-USER: CPT | Performed by: FAMILY MEDICINE

## 2018-10-01 PROCEDURE — G8427 DOCREV CUR MEDS BY ELIG CLIN: HCPCS | Performed by: FAMILY MEDICINE

## 2018-10-01 PROCEDURE — 17000 DESTRUCT PREMALG LESION: CPT | Performed by: FAMILY MEDICINE

## 2018-10-01 ASSESSMENT — PATIENT HEALTH QUESTIONNAIRE - PHQ9
SUM OF ALL RESPONSES TO PHQ QUESTIONS 1-9: 0
1. LITTLE INTEREST OR PLEASURE IN DOING THINGS: 0
2. FEELING DOWN, DEPRESSED OR HOPELESS: 0
SUM OF ALL RESPONSES TO PHQ9 QUESTIONS 1 & 2: 0
SUM OF ALL RESPONSES TO PHQ QUESTIONS 1-9: 0

## 2018-10-01 NOTE — PROGRESS NOTES
Subjective:      Patient ID: Mary Ellen Cantu is a 68 y.o. male. CC: Patient presents for re-evaluation of chronic health problems including hypertension, TIA history, hypercholesterol, osteoarthritis, hearing loss and skin lesion. HPI pt states he is here for a follow up on his hypertension, test results, and will like to have some spot on his head look at. Since last evaluation patient has been reevaluated in regards to his years and has had new hearing aids made. He feels he is doing much better with the new hearing aids compared to the old ones. He overall feels he's doing well. He still not exercising on a regular basis. He's had no TIA symptoms and he denies any chest pain or shortness of breath symptoms. Patient has several skin lesions on his scalp that he wanted examined. Patient is very compliant with medications with no adverse reactions. Review of Systems  Patient Active Problem List   Diagnosis    TIA (transient ischemic attack)    Hypertension    Benign non-nodular prostatic hyperplasia with lower urinary tract symptoms    Hypercholesterolemia    Primary osteoarthritis involving multiple joints       Outpatient Prescriptions Marked as Taking for the 10/1/18 encounter (Office Visit) with Shyanne Ramos MD   Medication Sig Dispense Refill    lisinopril (PRINIVIL;ZESTRIL) 2.5 MG tablet Take 1 tablet by mouth daily 90 tablet 3    triamterene-hydrochlorothiazide (MAXZIDE-25) 37.5-25 MG per tablet Take 1 tablet by mouth daily 90 tablet 3    doxazosin (CARDURA) 2 MG tablet Take 1 tablet by mouth daily 90 tablet 3    Red Yeast Rice Extract 600 MG CAPS Take 2 capsules by mouth daily.  aspirin 81 MG EC tablet Take 81 mg by mouth daily.            Allergies   Allergen Reactions    Shellfish-Derived Products Swelling     CRAB       Social History   Substance Use Topics    Smoking status: Former Smoker     Packs/day: 0.50     Years: 50.00     Start date: 1960     Quit date: 9/1/2015

## 2019-02-21 ENCOUNTER — HOSPITAL ENCOUNTER (OUTPATIENT)
Age: 74
Discharge: HOME | End: 2019-02-21
Payer: MEDICARE

## 2019-02-21 LAB
A/G RATIO: 1.8 (ref 1.1–2.2)
ALBUMIN SERPL-MCNC: 4.2 G/DL (ref 3.4–5)
ALP BLD-CCNC: 46 U/L (ref 40–129)
ALT SERPL-CCNC: 11 U/L (ref 10–40)
ANION GAP SERPL CALCULATED.3IONS-SCNC: 10 MMOL/L (ref 3–16)
AST SERPL-CCNC: 17 U/L (ref 15–37)
BILIRUB SERPL-MCNC: 0.5 MG/DL (ref 0–1)
BUN BLDV-MCNC: 7 MG/DL (ref 7–20)
CALCIUM SERPL-MCNC: 8.6 MG/DL (ref 8.3–10.6)
CHLORIDE BLD-SCNC: 96 MMOL/L (ref 99–110)
CHOLESTEROL, TOTAL: 154 MG/DL (ref 0–199)
CO2: 27 MMOL/L (ref 21–32)
CREAT SERPL-MCNC: 0.7 MG/DL (ref 0.8–1.3)
GFR AFRICAN AMERICAN: >60
GFR NON-AFRICAN AMERICAN: >60
GLOBULIN: 2.4 G/DL
GLUCOSE BLD-MCNC: 90 MG/DL (ref 70–99)
HDLC SERPL-MCNC: 59 MG/DL (ref 40–60)
LDL CHOLESTEROL CALCULATED: 81 MG/DL
POTASSIUM SERPL-SCNC: 4.2 MMOL/L (ref 3.5–5.1)
PROSTATE SPECIFIC ANTIGEN: 0.73 NG/ML (ref 0–4)
SODIUM BLD-SCNC: 133 MMOL/L (ref 136–145)
TOTAL PROTEIN: 6.6 G/DL (ref 6.4–8.2)
TRIGL SERPL-MCNC: 68 MG/DL (ref 0–150)
VLDLC SERPL CALC-MCNC: 14 MG/DL

## 2019-02-21 PROCEDURE — 80061 LIPID PANEL: CPT

## 2019-02-21 PROCEDURE — 36415 COLL VENOUS BLD VENIPUNCTURE: CPT

## 2019-02-21 PROCEDURE — 84153 ASSAY OF PSA TOTAL: CPT

## 2019-02-21 PROCEDURE — 80053 COMPREHEN METABOLIC PANEL: CPT

## 2019-02-21 PROCEDURE — G0103 PSA SCREENING: HCPCS

## 2019-03-04 ENCOUNTER — OFFICE VISIT (OUTPATIENT)
Dept: FAMILY MEDICINE CLINIC | Age: 74
End: 2019-03-04
Payer: MEDICARE

## 2019-03-04 VITALS
BODY MASS INDEX: 28.93 KG/M2 | WEIGHT: 204.5 LBS | HEART RATE: 84 BPM | DIASTOLIC BLOOD PRESSURE: 68 MMHG | OXYGEN SATURATION: 98 % | RESPIRATION RATE: 12 BRPM | SYSTOLIC BLOOD PRESSURE: 110 MMHG

## 2019-03-04 DIAGNOSIS — M15.9 PRIMARY OSTEOARTHRITIS INVOLVING MULTIPLE JOINTS: Primary | ICD-10-CM

## 2019-03-04 DIAGNOSIS — N40.1 BENIGN NON-NODULAR PROSTATIC HYPERPLASIA WITH LOWER URINARY TRACT SYMPTOMS: ICD-10-CM

## 2019-03-04 DIAGNOSIS — I10 ESSENTIAL HYPERTENSION: ICD-10-CM

## 2019-03-04 DIAGNOSIS — L57.0 ACTINIC KERATOSIS: ICD-10-CM

## 2019-03-04 PROCEDURE — 17000 DESTRUCT PREMALG LESION: CPT | Performed by: FAMILY MEDICINE

## 2019-03-04 PROCEDURE — 1123F ACP DISCUSS/DSCN MKR DOCD: CPT | Performed by: FAMILY MEDICINE

## 2019-03-04 PROCEDURE — G8427 DOCREV CUR MEDS BY ELIG CLIN: HCPCS | Performed by: FAMILY MEDICINE

## 2019-03-04 PROCEDURE — 4040F PNEUMOC VAC/ADMIN/RCVD: CPT | Performed by: FAMILY MEDICINE

## 2019-03-04 PROCEDURE — 3017F COLORECTAL CA SCREEN DOC REV: CPT | Performed by: FAMILY MEDICINE

## 2019-03-04 PROCEDURE — 99214 OFFICE O/P EST MOD 30 MIN: CPT | Performed by: FAMILY MEDICINE

## 2019-03-04 PROCEDURE — G8482 FLU IMMUNIZE ORDER/ADMIN: HCPCS | Performed by: FAMILY MEDICINE

## 2019-03-04 PROCEDURE — 17003 DESTRUCT PREMALG LES 2-14: CPT | Performed by: FAMILY MEDICINE

## 2019-03-04 PROCEDURE — 1101F PT FALLS ASSESS-DOCD LE1/YR: CPT | Performed by: FAMILY MEDICINE

## 2019-03-04 PROCEDURE — 1036F TOBACCO NON-USER: CPT | Performed by: FAMILY MEDICINE

## 2019-03-04 PROCEDURE — G8419 CALC BMI OUT NRM PARAM NOF/U: HCPCS | Performed by: FAMILY MEDICINE

## 2019-03-04 RX ORDER — DOXAZOSIN 2 MG/1
2 TABLET ORAL DAILY
Qty: 90 TABLET | Refills: 3 | Status: SHIPPED | OUTPATIENT
Start: 2019-03-04 | End: 2020-03-11 | Stop reason: SDUPTHER

## 2019-03-04 RX ORDER — TRIAMTERENE AND HYDROCHLOROTHIAZIDE 37.5; 25 MG/1; MG/1
1 TABLET ORAL DAILY
Qty: 90 TABLET | Refills: 3 | Status: SHIPPED | OUTPATIENT
Start: 2019-03-04 | End: 2020-03-11 | Stop reason: SDUPTHER

## 2019-03-04 RX ORDER — LISINOPRIL 2.5 MG/1
2.5 TABLET ORAL DAILY
Qty: 90 TABLET | Refills: 3 | Status: SHIPPED | OUTPATIENT
Start: 2019-03-04 | End: 2020-03-11 | Stop reason: SDUPTHER

## 2019-03-04 ASSESSMENT — PATIENT HEALTH QUESTIONNAIRE - PHQ9
2. FEELING DOWN, DEPRESSED OR HOPELESS: 0
SUM OF ALL RESPONSES TO PHQ QUESTIONS 1-9: 0
SUM OF ALL RESPONSES TO PHQ9 QUESTIONS 1 & 2: 0
1. LITTLE INTEREST OR PLEASURE IN DOING THINGS: 0
SUM OF ALL RESPONSES TO PHQ QUESTIONS 1-9: 0

## 2019-06-10 ENCOUNTER — OFFICE VISIT (OUTPATIENT)
Dept: FAMILY MEDICINE CLINIC | Age: 74
End: 2019-06-10
Payer: MEDICARE

## 2019-06-10 DIAGNOSIS — L57.0 ACTINIC KERATOSIS: ICD-10-CM

## 2019-06-10 DIAGNOSIS — H61.23 BILATERAL IMPACTED CERUMEN: Primary | ICD-10-CM

## 2019-06-10 DIAGNOSIS — H61.22 IMPACTED CERUMEN OF LEFT EAR: ICD-10-CM

## 2019-06-10 PROCEDURE — 4040F PNEUMOC VAC/ADMIN/RCVD: CPT | Performed by: FAMILY MEDICINE

## 2019-06-10 PROCEDURE — G8428 CUR MEDS NOT DOCUMENT: HCPCS | Performed by: FAMILY MEDICINE

## 2019-06-10 PROCEDURE — G8419 CALC BMI OUT NRM PARAM NOF/U: HCPCS | Performed by: FAMILY MEDICINE

## 2019-06-10 PROCEDURE — 99213 OFFICE O/P EST LOW 20 MIN: CPT | Performed by: FAMILY MEDICINE

## 2019-06-10 PROCEDURE — 1123F ACP DISCUSS/DSCN MKR DOCD: CPT | Performed by: FAMILY MEDICINE

## 2019-06-10 PROCEDURE — 3017F COLORECTAL CA SCREEN DOC REV: CPT | Performed by: FAMILY MEDICINE

## 2019-06-10 PROCEDURE — 1036F TOBACCO NON-USER: CPT | Performed by: FAMILY MEDICINE

## 2019-06-10 PROCEDURE — 69210 REMOVE IMPACTED EAR WAX UNI: CPT | Performed by: FAMILY MEDICINE

## 2019-06-10 RX ORDER — FLUOROURACIL 50 MG/G
CREAM TOPICAL
Qty: 40 G | Refills: 0 | Status: SHIPPED | OUTPATIENT
Start: 2019-06-10 | End: 2021-06-01

## 2019-06-10 NOTE — PROGRESS NOTES
Subjective:      Patient ID: Delmi Patton is a 68 y.o. male. CC: Patient presents for acute medical problem-wax occlusion with difficulty hearing out of the left ear and skin problems. Medical assistant notes reviewed. HPI pt is here due to having hard time hearing. Pt states he was seen by his audiologist and was advise he had lots of wax impacting his ears. Patient has been doing some home treatment but is not sure this is resolved his issue. Patient also wants to discuss skin problems. He has a lot of skin irritation on his scalp predominantly. He states his wife using a cream he would like a prescription for the same. Review of Systems  Allergies   Allergen Reactions    Shellfish-Derived Products Swelling     CRAB     Objective:   Physical Exam   Constitutional: He appears well-developed and well-nourished. No distress. HENT:   Right Ear: Tympanic membrane, external ear and ear canal normal.   Left Ear: Tympanic membrane and external ear normal.   Wax removed mechanically from the left ear     Neurological: He is alert. Skin: Skin is warm. Rash noted. Actinic keratosis noted predominantly of the dome of his scalp   Nursing note and vitals reviewed. Assessment:      Zo Khan was seen today for cerumen impaction. Diagnoses and all orders for this visit:    Bilateral impacted cerumen  -     MI REMOVAL IMPACTED CERUMEN INSTRUMENTATION UNILAT    Impacted cerumen of left ear    Actinic keratosis    Other orders  -     fluorouracil (EFUDEX) 5 % cream; Apply topically 2 times daily. Plan:      Cerumen impaction resolved  Patient was instructed how to use the Efudex cream  Keep RTC appointment    Please note that this chart was generated using Dragon dictation software. Although every effort was made to ensure the accuracy of this automated transcription, some errors in transcription may have occurred.             Arabella Dorsey

## 2019-08-27 ENCOUNTER — HOSPITAL ENCOUNTER (OUTPATIENT)
Age: 74
Discharge: HOME | End: 2019-08-27
Payer: MEDICARE

## 2019-08-27 LAB
ANION GAP SERPL CALCULATED.3IONS-SCNC: 14 MMOL/L (ref 3–16)
BUN BLDV-MCNC: 7 MG/DL (ref 7–20)
CALCIUM SERPL-MCNC: 9.3 MG/DL (ref 8.3–10.6)
CHLORIDE BLD-SCNC: 94 MMOL/L (ref 99–110)
CHOLESTEROL, TOTAL: 172 MG/DL (ref 0–199)
CO2: 27 MMOL/L (ref 21–32)
CREAT SERPL-MCNC: 0.7 MG/DL (ref 0.8–1.3)
GFR AFRICAN AMERICAN: >60
GFR NON-AFRICAN AMERICAN: >60
GLUCOSE BLD-MCNC: 103 MG/DL (ref 70–99)
POTASSIUM SERPL-SCNC: 4.7 MMOL/L (ref 3.5–5.1)
SODIUM BLD-SCNC: 135 MMOL/L (ref 136–145)

## 2019-08-27 PROCEDURE — 80048 BASIC METABOLIC PNL TOTAL CA: CPT

## 2019-08-27 PROCEDURE — 82465 ASSAY BLD/SERUM CHOLESTEROL: CPT

## 2019-08-27 PROCEDURE — 36415 COLL VENOUS BLD VENIPUNCTURE: CPT

## 2019-09-04 ENCOUNTER — OFFICE VISIT (OUTPATIENT)
Dept: FAMILY MEDICINE CLINIC | Age: 74
End: 2019-09-04
Payer: MEDICARE

## 2019-09-04 VITALS
BODY MASS INDEX: 28.29 KG/M2 | DIASTOLIC BLOOD PRESSURE: 72 MMHG | OXYGEN SATURATION: 97 % | HEART RATE: 91 BPM | WEIGHT: 200 LBS | SYSTOLIC BLOOD PRESSURE: 104 MMHG

## 2019-09-04 DIAGNOSIS — E78.00 HYPERCHOLESTEROLEMIA: ICD-10-CM

## 2019-09-04 DIAGNOSIS — N40.1 BENIGN NON-NODULAR PROSTATIC HYPERPLASIA WITH LOWER URINARY TRACT SYMPTOMS: ICD-10-CM

## 2019-09-04 DIAGNOSIS — Z12.5 SCREENING PSA (PROSTATE SPECIFIC ANTIGEN): ICD-10-CM

## 2019-09-04 DIAGNOSIS — I10 ESSENTIAL HYPERTENSION: Primary | ICD-10-CM

## 2019-09-04 DIAGNOSIS — H90.3 SENSORINEURAL HEARING LOSS (SNHL) OF BOTH EARS: ICD-10-CM

## 2019-09-04 DIAGNOSIS — G45.9 TIA (TRANSIENT ISCHEMIC ATTACK): ICD-10-CM

## 2019-09-04 PROBLEM — H61.22 IMPACTED CERUMEN OF LEFT EAR: Status: RESOLVED | Noted: 2019-06-10 | Resolved: 2019-09-04

## 2019-09-04 PROCEDURE — 1036F TOBACCO NON-USER: CPT | Performed by: FAMILY MEDICINE

## 2019-09-04 PROCEDURE — 4040F PNEUMOC VAC/ADMIN/RCVD: CPT | Performed by: FAMILY MEDICINE

## 2019-09-04 PROCEDURE — G8427 DOCREV CUR MEDS BY ELIG CLIN: HCPCS | Performed by: FAMILY MEDICINE

## 2019-09-04 PROCEDURE — 3017F COLORECTAL CA SCREEN DOC REV: CPT | Performed by: FAMILY MEDICINE

## 2019-09-04 PROCEDURE — 1123F ACP DISCUSS/DSCN MKR DOCD: CPT | Performed by: FAMILY MEDICINE

## 2019-09-04 PROCEDURE — 99214 OFFICE O/P EST MOD 30 MIN: CPT | Performed by: FAMILY MEDICINE

## 2019-09-04 PROCEDURE — G8419 CALC BMI OUT NRM PARAM NOF/U: HCPCS | Performed by: FAMILY MEDICINE

## 2020-03-11 RX ORDER — TRIAMTERENE AND HYDROCHLOROTHIAZIDE 37.5; 25 MG/1; MG/1
1 TABLET ORAL DAILY
Qty: 90 TABLET | Refills: 0 | Status: SHIPPED | OUTPATIENT
Start: 2020-03-11 | End: 2020-06-09

## 2020-03-11 RX ORDER — LISINOPRIL 2.5 MG/1
2.5 TABLET ORAL DAILY
Qty: 90 TABLET | Refills: 0 | Status: SHIPPED | OUTPATIENT
Start: 2020-03-11 | End: 2020-06-09

## 2020-03-11 RX ORDER — DOXAZOSIN 2 MG/1
2 TABLET ORAL DAILY
Qty: 90 TABLET | Refills: 0 | Status: SHIPPED | OUTPATIENT
Start: 2020-03-11 | End: 2020-06-09

## 2020-03-11 NOTE — TELEPHONE ENCOUNTER
Patient requesting a medication refill.   Medication: 1. triamterene-hydrochlorothiazide (CAFXJPZ-68) 37.5-25 MG per tablet   2. doxazosin (CARDURA) 2 MG tablet   3.lisinopril (PRINIVIL;ZESTRIL) 2.5 MG tablet   Pharmacy: [facility]/Pharmacy #[id number]  Last office visit: 9/4/2019  Next office visit: 4/22/2020

## 2020-03-17 RX ORDER — PENICILLIN V POTASSIUM 500 MG/1
500 TABLET ORAL 3 TIMES DAILY
Qty: 21 TABLET | Refills: 0 | Status: SHIPPED | OUTPATIENT
Start: 2020-03-17 | End: 2020-03-24

## 2020-03-17 NOTE — TELEPHONE ENCOUNTER
Pt stated that he woke up with a dental infection. He larry that this happened before, and he was put a z ash for it. Wants to know if one of the other providers can call him in a z ash since dr Betancourt Sample is out. Please advice.

## 2020-03-25 ENCOUNTER — OFFICE VISIT (OUTPATIENT)
Dept: FAMILY MEDICINE CLINIC | Age: 75
End: 2020-03-25
Payer: MEDICARE

## 2020-03-25 ENCOUNTER — TELEPHONE (OUTPATIENT)
Dept: FAMILY MEDICINE CLINIC | Age: 75
End: 2020-03-25

## 2020-03-25 VITALS
OXYGEN SATURATION: 98 % | WEIGHT: 203 LBS | HEART RATE: 78 BPM | TEMPERATURE: 98.2 F | SYSTOLIC BLOOD PRESSURE: 134 MMHG | BODY MASS INDEX: 28.72 KG/M2 | DIASTOLIC BLOOD PRESSURE: 64 MMHG

## 2020-03-25 PROCEDURE — G8417 CALC BMI ABV UP PARAM F/U: HCPCS | Performed by: FAMILY MEDICINE

## 2020-03-25 PROCEDURE — 1036F TOBACCO NON-USER: CPT | Performed by: FAMILY MEDICINE

## 2020-03-25 PROCEDURE — G8428 CUR MEDS NOT DOCUMENT: HCPCS | Performed by: FAMILY MEDICINE

## 2020-03-25 PROCEDURE — 99213 OFFICE O/P EST LOW 20 MIN: CPT | Performed by: FAMILY MEDICINE

## 2020-03-25 PROCEDURE — 1123F ACP DISCUSS/DSCN MKR DOCD: CPT | Performed by: FAMILY MEDICINE

## 2020-03-25 PROCEDURE — G8484 FLU IMMUNIZE NO ADMIN: HCPCS | Performed by: FAMILY MEDICINE

## 2020-03-25 PROCEDURE — 3017F COLORECTAL CA SCREEN DOC REV: CPT | Performed by: FAMILY MEDICINE

## 2020-03-25 PROCEDURE — 4040F PNEUMOC VAC/ADMIN/RCVD: CPT | Performed by: FAMILY MEDICINE

## 2020-03-25 RX ORDER — CEPHALEXIN 500 MG/1
500 CAPSULE ORAL 3 TIMES DAILY
Qty: 30 CAPSULE | Refills: 0 | Status: SHIPPED | OUTPATIENT
Start: 2020-03-25 | End: 2020-04-20 | Stop reason: SDUPTHER

## 2020-03-25 NOTE — TELEPHONE ENCOUNTER
He has rash on the back of his back around his spine and its about the size of about 7-8 inches. The rash is very sensitive and red. Patient's wife states that she tried to use cream and it was very painful. Please advise if can be treated over the phone or if he needs to be seen today. His wife states she can send a picture if needed.      [facility] [employer]

## 2020-03-25 NOTE — PROGRESS NOTES
Subjective:      Patient ID: Tan Garcia is a 69 y.o. male. CC: Patient presents for acute medical problem-rash and discomfort in the lower part of his back. Medical assistant notes reviewed. HPI Patient presents with rash, itching, lower back pain and red bumps for the past couple of days. Patient's wife tried to use an over the counter dry skin cream and it burned when she put it on the area. He states just sitting there his back is hurting. Patient's wife told him that he had shingles. Review of Systems     Allergies   Allergen Reactions    Shellfish-Derived Products Swelling     CRAB       Objective:   Physical Exam  Vitals signs and nursing note reviewed. Constitutional:       General: He is not in acute distress. Appearance: He is well-developed. Skin:     General: Skin is warm and dry. Findings: Rash (Follicular appearing rash in the lower portion of his back of approximately 10 x 20 cm.) present. Neurological:      Mental Status: He is alert. Psychiatric:         Behavior: Behavior is cooperative. Assessment:      Alton Caodaism was seen today for rash. Diagnoses and all orders for this visit:    Folliculitis    Other orders  -     cephALEXin (KEFLEX) 500 MG capsule;  Take 1 capsule by mouth 3 times daily for 10 days            Plan:      Advised patient he does not have shingles  Start antibiotic therapy  Informational handout provided  RTC PRN

## 2020-03-25 NOTE — PATIENT INSTRUCTIONS
folliculitis. · Use a new blade in your razor each time you shave to keep from re-infecting your skin. · If you tend to get folliculitis, avoid using hot tubs. They can contain bacteria that cause folliculitis. When should you call for help? Call your doctor now or seek immediate medical care if:    · You have symptoms of infection, such as:  ? Increased pain, swelling, warmth, or redness. ? Red streaks leading from the area. ? Pus draining from the area. ? A fever.    Watch closely for changes in your health, and be sure to contact your doctor if:    · You do not get better as expected. Where can you learn more? Go to [email or web address].[email or web address]. org and sign in to your [employer] account. Enter M257 in the WiDaPeople box to learn more about \"Folliculitis: Care Instructions. \"     If you do not have an account, please click on the \"Sign Up Now\" link. Current as of: October 30, 2019Content Version: 12.4  © 1025-0686 [employer], Incorporated. Care instructions adapted under license by [facility] [facility] If you have questions about a medical condition or this instruction, always ask your healthcare professional. [address] any warranty or liability for your use of this information.

## 2020-04-18 ENCOUNTER — TELEPHONE (OUTPATIENT)
Dept: FAMILY MEDICINE CLINIC | Age: 75
End: 2020-04-18

## 2020-04-20 RX ORDER — CEPHALEXIN 500 MG/1
500 CAPSULE ORAL 3 TIMES DAILY
Qty: 21 CAPSULE | Refills: 0 | Status: SHIPPED | OUTPATIENT
Start: 2020-04-20 | End: 2020-04-27

## 2020-06-09 RX ORDER — DOXAZOSIN 2 MG/1
TABLET ORAL
Qty: 90 TABLET | Refills: 0 | Status: SHIPPED | OUTPATIENT
Start: 2020-06-09 | End: 2020-06-30 | Stop reason: SDUPTHER

## 2020-06-09 RX ORDER — LISINOPRIL 2.5 MG/1
TABLET ORAL
Qty: 90 TABLET | Refills: 0 | Status: SHIPPED | OUTPATIENT
Start: 2020-06-09 | End: 2020-06-30 | Stop reason: SDUPTHER

## 2020-06-09 RX ORDER — TRIAMTERENE AND HYDROCHLOROTHIAZIDE 37.5; 25 MG/1; MG/1
TABLET ORAL
Qty: 90 TABLET | Refills: 0 | Status: SHIPPED | OUTPATIENT
Start: 2020-06-09 | End: 2020-06-30 | Stop reason: SDUPTHER

## 2020-06-23 ENCOUNTER — HOSPITAL ENCOUNTER (OUTPATIENT)
Age: 75
Discharge: HOME | End: 2020-06-23
Payer: MEDICARE

## 2020-06-23 LAB
A/G RATIO: 1.7 (ref 1.1–2.2)
ALBUMIN SERPL-MCNC: 4 G/DL (ref 3.4–5)
ALP BLD-CCNC: 46 U/L (ref 40–129)
ALT SERPL-CCNC: 8 U/L (ref 10–40)
ANION GAP SERPL CALCULATED.3IONS-SCNC: 15 MMOL/L (ref 3–16)
AST SERPL-CCNC: 15 U/L (ref 15–37)
BILIRUB SERPL-MCNC: 0.5 MG/DL (ref 0–1)
BUN BLDV-MCNC: 8 MG/DL (ref 7–20)
CALCIUM SERPL-MCNC: 8.8 MG/DL (ref 8.3–10.6)
CHLORIDE BLD-SCNC: 95 MMOL/L (ref 99–110)
CHOLESTEROL, TOTAL: 160 MG/DL (ref 0–199)
CO2: 23 MMOL/L (ref 21–32)
CREAT SERPL-MCNC: 0.6 MG/DL (ref 0.8–1.3)
GFR AFRICAN AMERICAN: >60
GFR NON-AFRICAN AMERICAN: >60
GLOBULIN: 2.4 G/DL
GLUCOSE BLD-MCNC: 85 MG/DL (ref 70–99)
HDLC SERPL-MCNC: 60 MG/DL (ref 40–60)
LDL CHOLESTEROL CALCULATED: 94 MG/DL
POTASSIUM SERPL-SCNC: 4 MMOL/L (ref 3.5–5.1)
PROSTATE SPECIFIC ANTIGEN: 0.56 NG/ML (ref 0–4)
SODIUM BLD-SCNC: 133 MMOL/L (ref 136–145)
TOTAL PROTEIN: 6.4 G/DL (ref 6.4–8.2)
TRIGL SERPL-MCNC: 30 MG/DL (ref 0–150)
VLDLC SERPL CALC-MCNC: 6 MG/DL

## 2020-06-23 PROCEDURE — 80061 LIPID PANEL: CPT

## 2020-06-23 PROCEDURE — 84153 ASSAY OF PSA TOTAL: CPT

## 2020-06-23 PROCEDURE — 36415 COLL VENOUS BLD VENIPUNCTURE: CPT

## 2020-06-23 PROCEDURE — 80053 COMPREHEN METABOLIC PANEL: CPT

## 2020-06-30 ENCOUNTER — OFFICE VISIT (OUTPATIENT)
Dept: FAMILY MEDICINE CLINIC | Age: 75
End: 2020-06-30
Payer: MEDICARE

## 2020-06-30 VITALS
DIASTOLIC BLOOD PRESSURE: 60 MMHG | WEIGHT: 197 LBS | BODY MASS INDEX: 27.87 KG/M2 | TEMPERATURE: 98 F | HEART RATE: 89 BPM | SYSTOLIC BLOOD PRESSURE: 132 MMHG | OXYGEN SATURATION: 97 %

## 2020-06-30 PROCEDURE — G8427 DOCREV CUR MEDS BY ELIG CLIN: HCPCS | Performed by: FAMILY MEDICINE

## 2020-06-30 PROCEDURE — 3288F FALL RISK ASSESSMENT DOCD: CPT | Performed by: FAMILY MEDICINE

## 2020-06-30 PROCEDURE — 1036F TOBACCO NON-USER: CPT | Performed by: FAMILY MEDICINE

## 2020-06-30 PROCEDURE — 4040F PNEUMOC VAC/ADMIN/RCVD: CPT | Performed by: FAMILY MEDICINE

## 2020-06-30 PROCEDURE — 17110 DESTRUCTION B9 LES UP TO 14: CPT | Performed by: FAMILY MEDICINE

## 2020-06-30 PROCEDURE — 99214 OFFICE O/P EST MOD 30 MIN: CPT | Performed by: FAMILY MEDICINE

## 2020-06-30 PROCEDURE — G8417 CALC BMI ABV UP PARAM F/U: HCPCS | Performed by: FAMILY MEDICINE

## 2020-06-30 PROCEDURE — 3017F COLORECTAL CA SCREEN DOC REV: CPT | Performed by: FAMILY MEDICINE

## 2020-06-30 PROCEDURE — 1123F ACP DISCUSS/DSCN MKR DOCD: CPT | Performed by: FAMILY MEDICINE

## 2020-06-30 PROCEDURE — G8510 SCR DEP NEG, NO PLAN REQD: HCPCS | Performed by: FAMILY MEDICINE

## 2020-06-30 RX ORDER — LISINOPRIL 2.5 MG/1
TABLET ORAL
Qty: 90 TABLET | Refills: 3 | Status: SHIPPED | OUTPATIENT
Start: 2020-06-30 | End: 2021-01-27 | Stop reason: SINTOL

## 2020-06-30 RX ORDER — TRIAMTERENE AND HYDROCHLOROTHIAZIDE 37.5; 25 MG/1; MG/1
TABLET ORAL
Qty: 90 TABLET | Refills: 3 | Status: SHIPPED | OUTPATIENT
Start: 2020-06-30 | End: 2021-06-01 | Stop reason: SDUPTHER

## 2020-06-30 RX ORDER — VITAMIN B COMPLEX
1 CAPSULE ORAL DAILY
COMMUNITY

## 2020-06-30 RX ORDER — DOXAZOSIN 2 MG/1
TABLET ORAL
Qty: 90 TABLET | Refills: 3 | Status: SHIPPED | OUTPATIENT
Start: 2020-06-30 | End: 2021-06-01 | Stop reason: SDUPTHER

## 2020-06-30 ASSESSMENT — PATIENT HEALTH QUESTIONNAIRE - PHQ9
SUM OF ALL RESPONSES TO PHQ9 QUESTIONS 1 & 2: 0
1. LITTLE INTEREST OR PLEASURE IN DOING THINGS: 0
SUM OF ALL RESPONSES TO PHQ QUESTIONS 1-9: 0
SUM OF ALL RESPONSES TO PHQ QUESTIONS 1-9: 0
2. FEELING DOWN, DEPRESSED OR HOPELESS: 0

## 2020-06-30 NOTE — PROGRESS NOTES
Cryotherapy Procedure Note    Pre-operative Diagnosis:wart 1    Post-operative Diagnosis: same    Locations: Right index finger    Procedure Details   2 cycles of liquid nitrogen applied to affected sites. Complications: none. Plan:  1. Patient was educated regarding the potential risks of blister formation, discomfort, hypopigmentation, and scar. 2. Wound care was discussed. 3. Recommended that the patient use OTC analgesics as needed for pain. 4. Plan for RTC in 3-4 weeks for re-treatment if needed.

## 2020-06-30 NOTE — PROGRESS NOTES
History     Tobacco Use    Smoking status: Former Smoker     Packs/day: 0.50     Years: 50.00     Pack years: 25.00     Start date:      Last attempt to quit: 2015     Years since quittin.8    Smokeless tobacco: Never Used   Substance Use Topics    Alcohol use: Yes     Alcohol/week: 42.0 standard drinks     Types: 42 Cans of beer per week     Comment: 6 beers daily       /60 (Site: Left Upper Arm, Position: Sitting, Cuff Size: Medium Adult)   Pulse 89   Temp 98 °F (36.7 °C) (Infrared)   Wt 197 lb (89.4 kg)   SpO2 97%   BMI 27.87 kg/m²         Objective:   Physical Exam  Constitutional:       General: He is not in acute distress. Appearance: He is well-developed. Neck:      Vascular: No carotid bruit. Cardiovascular:      Rate and Rhythm: Normal rate and regular rhythm. Pulses:           Dorsalis pedis pulses are 2+ on the right side and 2+ on the left side. Posterior tibial pulses are 2+ on the right side and 2+ on the left side. Heart sounds: Normal heart sounds. No murmur. No friction rub. No gallop. Pulmonary:      Effort: Pulmonary effort is normal.      Breath sounds: Normal breath sounds. Musculoskeletal: Normal range of motion. General: No tenderness. Right knee: He exhibits deformity (minimal crepitus). He exhibits normal range of motion. No tenderness found. Left knee: Normal.      Right lower leg: No edema. Left lower leg: No edema. Skin:     Findings: Lesion present. Comments: Right index finger with a 4 mm warty growth   Neurological:      Mental Status: He is alert and oriented to person, place, and time. Cranial Nerves: No cranial nerve deficit. Sensory: Sensation is intact. No sensory deficit. Motor: Motor function is intact. Deep Tendon Reflexes:      Reflex Scores:       Patellar reflexes are 2+ on the right side and 2+ on the left side.        Achilles reflexes are 2+ on the right side and 2+ on the left side. Psychiatric:         Behavior: Behavior is cooperative. Assessment:      Bette Khalil was seen today for follow-up and hypertension. Diagnoses and all orders for this visit:    Essential hypertension  -     lisinopril (PRINIVIL;ZESTRIL) 2.5 MG tablet; TAKE 1 TABLET BY MOUTH EVERY DAY  -     triamterene-hydroCHLOROthiazide (MAXZIDE-25) 37.5-25 MG per tablet; TAKE 1 TABLET BY MOUTH EVERY DAY  -     Basic Metabolic Panel; Future    Hypercholesterolemia    Primary osteoarthritis involving multiple joints    TIA (transient ischemic attack)    Benign non-nodular prostatic hyperplasia with lower urinary tract symptoms    Viral warts, unspecified type  -     AK DESTRUCTION BENIGN LESIONS UP TO 14    Other orders  -     doxazosin (CARDURA) 2 MG tablet; TAKE 1 TABLET BY MOUTH EVERY DAY            Plan:      Pt appears stable & reviewed labs with patient. Maintain current medications. Patient received counseling on the following healthy behaviors: nutrition and exercise     Patient given educational materials     Health maintenance updated    Discussed use, benefit, and side effects of prescribed medications. Barriers to medication compliance addressed. All patient questions answered. Pt voiced understanding. Patient needs RTC in 4 months. Please note that this chart was generated using Dragon dictation software. Although every effort was made to ensure the accuracy of this automated transcription, some errors in transcription may have occurred.

## 2020-11-24 ENCOUNTER — HOSPITAL ENCOUNTER (OUTPATIENT)
Age: 75
Discharge: HOME | End: 2020-11-24
Payer: MEDICARE

## 2020-11-24 LAB
ANION GAP SERPL CALCULATED.3IONS-SCNC: 9 MMOL/L (ref 3–16)
BUN BLDV-MCNC: 10 MG/DL (ref 7–20)
CALCIUM SERPL-MCNC: 8.9 MG/DL (ref 8.3–10.6)
CHLORIDE BLD-SCNC: 94 MMOL/L (ref 99–110)
CO2: 28 MMOL/L (ref 21–32)
CREAT SERPL-MCNC: 0.7 MG/DL (ref 0.8–1.3)
GFR AFRICAN AMERICAN: >60
GFR NON-AFRICAN AMERICAN: >60
GLUCOSE BLD-MCNC: 91 MG/DL (ref 70–99)
POTASSIUM SERPL-SCNC: 4.1 MMOL/L (ref 3.5–5.1)
SODIUM BLD-SCNC: 131 MMOL/L (ref 136–145)

## 2020-11-24 PROCEDURE — 80048 BASIC METABOLIC PNL TOTAL CA: CPT

## 2020-11-24 PROCEDURE — 36415 COLL VENOUS BLD VENIPUNCTURE: CPT

## 2020-11-30 ENCOUNTER — OFFICE VISIT (OUTPATIENT)
Dept: FAMILY MEDICINE CLINIC | Age: 75
End: 2020-11-30
Payer: MEDICARE

## 2020-11-30 VITALS
BODY MASS INDEX: 27.58 KG/M2 | TEMPERATURE: 97.5 F | OXYGEN SATURATION: 98 % | DIASTOLIC BLOOD PRESSURE: 72 MMHG | WEIGHT: 195 LBS | HEART RATE: 79 BPM | SYSTOLIC BLOOD PRESSURE: 136 MMHG

## 2020-11-30 PROCEDURE — G8484 FLU IMMUNIZE NO ADMIN: HCPCS | Performed by: FAMILY MEDICINE

## 2020-11-30 PROCEDURE — 99214 OFFICE O/P EST MOD 30 MIN: CPT | Performed by: FAMILY MEDICINE

## 2020-11-30 PROCEDURE — 1036F TOBACCO NON-USER: CPT | Performed by: FAMILY MEDICINE

## 2020-11-30 PROCEDURE — G8417 CALC BMI ABV UP PARAM F/U: HCPCS | Performed by: FAMILY MEDICINE

## 2020-11-30 PROCEDURE — 1123F ACP DISCUSS/DSCN MKR DOCD: CPT | Performed by: FAMILY MEDICINE

## 2020-11-30 PROCEDURE — 3017F COLORECTAL CA SCREEN DOC REV: CPT | Performed by: FAMILY MEDICINE

## 2020-11-30 PROCEDURE — G8427 DOCREV CUR MEDS BY ELIG CLIN: HCPCS | Performed by: FAMILY MEDICINE

## 2020-11-30 PROCEDURE — 4040F PNEUMOC VAC/ADMIN/RCVD: CPT | Performed by: FAMILY MEDICINE

## 2020-11-30 NOTE — PROGRESS NOTES
Subjective:      Patient ID: Maira Hunt is a 77 y.o. male. CC: Patient presents for re-evaluation of chronic health problems including hypertension, history of TIA, history of smoking, fatigue with weight loss and mild cough for the last 5 months. Chaz REY Patient presents today for a follow-up on chronic medications and medical conditions. Patient has had a loss of energy since the summer. He states he has lost about 10 lbs in the last year. He has also had a dry cough for about one month on and off. Patient that the cough is really not severe. He was concerned that he is a smoker but he has not smoked for the last 10 years. Prior to that he smoked 2 packs of cigarettes per day. He suddenly lost weight and decreased energy. Prior to that his knee is not as active and not eating out in restaurants because of the Covid pandemic. He denies any strokelike symptoms. He continues to wear his hearing aids. He has a good support system with his wife and family. He denies any chest pain or shortness of breath symptoms. Patient is very compliant with medications with no adverse reactions.     Review of Systems     Patient Active Problem List   Diagnosis    TIA (transient ischemic attack)    Hypertension    Benign non-nodular prostatic hyperplasia with lower urinary tract symptoms    Hypercholesterolemia    Primary osteoarthritis involving multiple joints    Sensorineural hearing loss (SNHL) of both ears       Outpatient Medications Marked as Taking for the 11/30/20 encounter (Office Visit) with Melvin Last MD   Medication Sig Dispense Refill    b complex vitamins capsule Take 1 capsule by mouth daily      doxazosin (CARDURA) 2 MG tablet TAKE 1 TABLET BY MOUTH EVERY DAY 90 tablet 3    lisinopril (PRINIVIL;ZESTRIL) 2.5 MG tablet TAKE 1 TABLET BY MOUTH EVERY DAY 90 tablet 3    triamterene-hydroCHLOROthiazide (MAXZIDE-25) 37.5-25 MG per tablet TAKE 1 TABLET BY MOUTH EVERY DAY 90 tablet 3    fluorouracil (EFUDEX) 5 % cream Apply topically 2 times daily. (Patient taking differently: as needed Apply topically 2 times daily.) 40 g 0    Red Yeast Rice Extract 600 MG CAPS Take 2 capsules by mouth daily.  aspirin 81 MG EC tablet Take 81 mg by mouth daily. Allergies   Allergen Reactions    Shellfish-Derived Products Swelling     CRAB       Social History     Tobacco Use    Smoking status: Former Smoker     Packs/day: 0.50     Years: 50.00     Pack years: 25.00     Start date:      Last attempt to quit: 2015     Years since quittin.2    Smokeless tobacco: Never Used   Substance Use Topics    Alcohol use: Yes     Alcohol/week: 42.0 standard drinks     Types: 42 Cans of beer per week     Comment: 6 beers daily       /72 (Site: Right Upper Arm, Position: Sitting, Cuff Size: Medium Adult)   Pulse 79   Temp 97.5 °F (36.4 °C) (Infrared)   Wt 195 lb (88.5 kg)   SpO2 98%   BMI 27.58 kg/m²         Objective:   Physical Exam  Constitutional:       General: He is not in acute distress. Appearance: Normal appearance. He is well-developed. Neck:      Vascular: No carotid bruit. Cardiovascular:      Rate and Rhythm: Normal rate and regular rhythm. Pulses:           Dorsalis pedis pulses are 2+ on the right side and 2+ on the left side. Posterior tibial pulses are 2+ on the right side and 2+ on the left side. Heart sounds: Normal heart sounds. No murmur. No friction rub. No gallop. Pulmonary:      Effort: Pulmonary effort is normal.      Breath sounds: Normal breath sounds. Abdominal:      General: Bowel sounds are normal. There is no distension. Palpations: Abdomen is soft. Abdomen is not rigid. There is no hepatomegaly, splenomegaly or mass. Tenderness: There is no abdominal tenderness. There is no right CVA tenderness, left CVA tenderness, guarding or rebound. Hernia: No hernia is present. Musculoskeletal: Normal range of motion.

## 2020-12-01 ENCOUNTER — HOSPITAL ENCOUNTER (OUTPATIENT)
Age: 75
Discharge: HOME | End: 2020-12-01
Payer: MEDICARE

## 2020-12-01 LAB
HCT VFR BLD CALC: 38.8 % (ref 40.5–52.5)
HEMOGLOBIN: 13.4 G/DL (ref 13.5–17.5)
MCH RBC QN AUTO: 33.4 PG (ref 26–34)
MCHC RBC AUTO-ENTMCNC: 34.5 G/DL (ref 31–36)
MCV RBC AUTO: 96.9 FL (ref 80–100)
PDW BLD-RTO: 12.9 % (ref 12.4–15.4)
PLATELET # BLD: 215 K/UL (ref 135–450)
PMV BLD AUTO: 7.4 FL (ref 5–10.5)
RBC # BLD: 4.01 M/UL (ref 4.2–5.9)
T4 FREE: 1.5 NG/DL (ref 0.9–1.8)
TSH SERPL DL<=0.05 MIU/L-ACNC: 0.75 UIU/ML (ref 0.27–4.2)
WBC # BLD: 5.3 K/UL (ref 4–11)

## 2020-12-01 PROCEDURE — 84443 ASSAY THYROID STIM HORMONE: CPT

## 2020-12-01 PROCEDURE — 36415 COLL VENOUS BLD VENIPUNCTURE: CPT

## 2020-12-01 PROCEDURE — 84439 ASSAY OF FREE THYROXINE: CPT

## 2020-12-01 PROCEDURE — 85027 COMPLETE CBC AUTOMATED: CPT

## 2020-12-03 ENCOUNTER — HOSPITAL ENCOUNTER (OUTPATIENT)
Dept: CT IMAGING | Age: 75
Discharge: HOME | End: 2020-12-03
Payer: MEDICARE

## 2020-12-03 PROCEDURE — 71250 CT THORAX DX C-: CPT

## 2020-12-30 ENCOUNTER — OFFICE VISIT (OUTPATIENT)
Dept: FAMILY MEDICINE CLINIC | Age: 75
End: 2020-12-30
Payer: MEDICARE

## 2020-12-30 VITALS
HEIGHT: 70 IN | OXYGEN SATURATION: 99 % | SYSTOLIC BLOOD PRESSURE: 132 MMHG | WEIGHT: 197 LBS | BODY MASS INDEX: 28.2 KG/M2 | DIASTOLIC BLOOD PRESSURE: 72 MMHG | TEMPERATURE: 97.5 F | HEART RATE: 84 BPM

## 2020-12-30 PROCEDURE — G8484 FLU IMMUNIZE NO ADMIN: HCPCS | Performed by: PHYSICIAN ASSISTANT

## 2020-12-30 PROCEDURE — 1123F ACP DISCUSS/DSCN MKR DOCD: CPT | Performed by: PHYSICIAN ASSISTANT

## 2020-12-30 PROCEDURE — 4040F PNEUMOC VAC/ADMIN/RCVD: CPT | Performed by: PHYSICIAN ASSISTANT

## 2020-12-30 PROCEDURE — 99213 OFFICE O/P EST LOW 20 MIN: CPT | Performed by: PHYSICIAN ASSISTANT

## 2020-12-30 PROCEDURE — G8417 CALC BMI ABV UP PARAM F/U: HCPCS | Performed by: PHYSICIAN ASSISTANT

## 2020-12-30 PROCEDURE — G8427 DOCREV CUR MEDS BY ELIG CLIN: HCPCS | Performed by: PHYSICIAN ASSISTANT

## 2020-12-30 PROCEDURE — 3017F COLORECTAL CA SCREEN DOC REV: CPT | Performed by: PHYSICIAN ASSISTANT

## 2020-12-30 PROCEDURE — 1036F TOBACCO NON-USER: CPT | Performed by: PHYSICIAN ASSISTANT

## 2020-12-30 RX ORDER — CEPHALEXIN 500 MG/1
500 CAPSULE ORAL 3 TIMES DAILY
Qty: 90 CAPSULE | Refills: 0 | Status: SHIPPED | OUTPATIENT
Start: 2020-12-30 | End: 2021-01-29

## 2020-12-30 ASSESSMENT — ENCOUNTER SYMPTOMS
WHEEZING: 0
NAUSEA: 0
TROUBLE SWALLOWING: 0
VOMITING: 0
SHORTNESS OF BREATH: 0

## 2020-12-30 NOTE — PROGRESS NOTES
Subjective:      Patient ID: Coleen Mills is a 77 y.o. male. HPI Patient is here today for a rash that has been on his back and chest for 3 weeks. It is itchy, not painful. Had same thing back in March and took Keflex and it went away. No trouble breathing or swallowing. Review of Systems   Constitutional: Negative for appetite change, chills and fever. HENT: Negative for trouble swallowing. Respiratory: Negative for shortness of breath and wheezing. Gastrointestinal: Negative for nausea and vomiting. Skin: Positive for rash. Neurological: Negative for dizziness and headaches. Objective:   Physical Exam  Vitals signs reviewed. Constitutional:       Appearance: Normal appearance. He is well-developed and well-groomed. Skin:     Comments: Lower back and chest with some slightly raised pink papules, some scabbed areas from scratching, no visual pustules, no vesicles   Neurological:      Mental Status: He is alert and oriented to person, place, and time. Psychiatric:         Attention and Perception: Attention normal.         Mood and Affect: Mood normal.         Speech: Speech normal.         Behavior: Behavior normal. Behavior is cooperative. Assessment:      Ricardo Moyer was seen today for rash. Diagnoses and all orders for this visit:    Folliculitis  -     cephALEXin (KEFLEX) 500 MG capsule; Take 1 capsule by mouth 3 times daily             Plan:      Call if not resolving.          Vivi Worrell

## 2020-12-30 NOTE — PATIENT INSTRUCTIONS
Gena Dowling was seen today for rash. Diagnoses and all orders for this visit:    Folliculitis  -     cephALEXin (KEFLEX) 500 MG capsule; Take 1 capsule by mouth 3 times daily       Call if not resolving.

## 2021-01-25 ENCOUNTER — TELEPHONE (OUTPATIENT)
Dept: FAMILY MEDICINE CLINIC | Age: 76
End: 2021-01-25

## 2021-01-25 NOTE — TELEPHONE ENCOUNTER
----- Message from Surendra Leach sent at 1/25/2021 11:58 AM EST -----  Subject: Message to Provider    QUESTIONS  Information for Provider? patient see PA around the first week of January   he had anobotics 30 day supply and the rash as still not gone away.   ---------------------------------------------------------------------------  --------------  CALL BACK INFO  What is the best way for the office to contact you? OK to leave message on   voicemail  Preferred Call Back Phone Number? [phone]  ---------------------------------------------------------------------------  --------------  SCRIPT ANSWERS  Relationship to Patient? Self  Appointment reason? Symptomatic  Select script based on patient symptoms? Adult Skin Problems [Rash   Hives   Blisters   Lumps   Bumps   Sores   Bite]   Are you having swelling in your throat or face? No  Are you having difficulty breathing? No  Have the symptoms worsened or spread in the last day? No  Are you having pain with your symptoms? No  Are you having fevers (100.4)   chills or sweats? No  Have you previously seen a provider for this?  Yes

## 2021-01-27 ENCOUNTER — PROCEDURE VISIT (OUTPATIENT)
Dept: FAMILY MEDICINE CLINIC | Age: 76
End: 2021-01-27
Payer: MEDICARE

## 2021-01-27 VITALS
WEIGHT: 200 LBS | SYSTOLIC BLOOD PRESSURE: 120 MMHG | HEART RATE: 82 BPM | TEMPERATURE: 97.8 F | DIASTOLIC BLOOD PRESSURE: 80 MMHG | OXYGEN SATURATION: 99 % | BODY MASS INDEX: 28.7 KG/M2

## 2021-01-27 DIAGNOSIS — I10 ESSENTIAL HYPERTENSION: ICD-10-CM

## 2021-01-27 DIAGNOSIS — L30.9 ECZEMA, UNSPECIFIED TYPE: Primary | ICD-10-CM

## 2021-01-27 PROCEDURE — 1123F ACP DISCUSS/DSCN MKR DOCD: CPT | Performed by: FAMILY MEDICINE

## 2021-01-27 PROCEDURE — 3288F FALL RISK ASSESSMENT DOCD: CPT | Performed by: FAMILY MEDICINE

## 2021-01-27 PROCEDURE — 99213 OFFICE O/P EST LOW 20 MIN: CPT | Performed by: FAMILY MEDICINE

## 2021-01-27 PROCEDURE — G8417 CALC BMI ABV UP PARAM F/U: HCPCS | Performed by: FAMILY MEDICINE

## 2021-01-27 PROCEDURE — G8510 SCR DEP NEG, NO PLAN REQD: HCPCS | Performed by: FAMILY MEDICINE

## 2021-01-27 PROCEDURE — G8428 CUR MEDS NOT DOCUMENT: HCPCS | Performed by: FAMILY MEDICINE

## 2021-01-27 PROCEDURE — 4040F PNEUMOC VAC/ADMIN/RCVD: CPT | Performed by: FAMILY MEDICINE

## 2021-01-27 PROCEDURE — G8484 FLU IMMUNIZE NO ADMIN: HCPCS | Performed by: FAMILY MEDICINE

## 2021-01-27 PROCEDURE — 3017F COLORECTAL CA SCREEN DOC REV: CPT | Performed by: FAMILY MEDICINE

## 2021-01-27 PROCEDURE — 1036F TOBACCO NON-USER: CPT | Performed by: FAMILY MEDICINE

## 2021-01-27 RX ORDER — PREDNISONE 20 MG/1
TABLET ORAL
Qty: 15 TABLET | Refills: 0 | Status: SHIPPED | OUTPATIENT
Start: 2021-01-27 | End: 2021-06-01

## 2021-01-27 RX ORDER — CLOBETASOL PROPIONATE 0.5 MG/G
CREAM TOPICAL
Qty: 60 G | Refills: 5 | Status: SHIPPED | OUTPATIENT
Start: 2021-01-27

## 2021-01-27 ASSESSMENT — PATIENT HEALTH QUESTIONNAIRE - PHQ9
SUM OF ALL RESPONSES TO PHQ QUESTIONS 1-9: 0
SUM OF ALL RESPONSES TO PHQ9 QUESTIONS 1 & 2: 0
2. FEELING DOWN, DEPRESSED OR HOPELESS: 0
SUM OF ALL RESPONSES TO PHQ QUESTIONS 1-9: 0

## 2021-01-27 NOTE — PROGRESS NOTES
Subjective:      Patient ID: Apolinar Interiano is a 77 y.o. male. CC: Patient presents for acute medical problem-persistent rash. Medical assistant notes reviewed. HPI Patient presents with a rash on his back on and off for 6 months. Patient has a rash in March of last year and then a calm down rhythm thought to be folliculitis. He was reevaluated in December when he started with a rash in November. The rash is pruritic in nature predominantly in the midportion of his back but now it is also up on the shoulder areas. Patient typically does use Dove soap. His wife has tried over-the-counter moisturizers to the sites as well. Antibiotic therapy may no effect on his irritation. He is also been monitoring blood pressure at home since stopping the lisinopril blood pressure medication. The cough problem has completely resolved. Blood pressures typically range between 120 up to 174 on the systolic number. Diastolic is not a problem his pulse rate stable. Review of Systems     Allergies   Allergen Reactions    Shellfish-Derived Products Swelling     CRAB       Objective:   Physical Exam  Vitals signs and nursing note reviewed. Constitutional:       General: He is not in acute distress. Appearance: He is well-developed. Skin:     General: Skin is warm. Findings: Rash present. Comments: Multiple raised dried areas on his mid upper back and extends across the shoulder blade areas. He does have some excoriated sites that this as well. No rash noted on neck or face area. No rash noted on upper extremities. Neurological:      Mental Status: He is alert. Psychiatric:         Behavior: Behavior is cooperative. Assessment:      Yamileth Siddiqui was seen today for rash.     Diagnoses and all orders for this visit:    Eczema, unspecified type    Other orders  -     predniSONE (DELTASONE) 20 MG tablet; 1 TID for 3 day then 1 BID  -     clobetasol (TEMOVATE) 0.05 % cream; Apply topically 1 times daily.            Plan:      Informational handout provided    Continue with blood pressure evaluations every 2 weeks. Recommend obviously keep the systolic numbers less than 140. He is on call back with blood pressure does not stay maintained. RTC PRN    Medical decision making of low complexity        Please note that this chart was generated using Dragon dictation software. Although every effort was made to ensure the accuracy of this automated transcription, some errors in transcription may have occurred.

## 2021-01-27 NOTE — PATIENT INSTRUCTIONS
Patient Education        Atopic Dermatitis: Care Instructions  Your Care Instructions  Atopic dermatitis (also called eczema) is a skin problem that causes intense itching and a red, raised rash. In severe cases, the rash develops clear fluidfilled blisters. The rash is not contagious. People with this condition seem to have very sensitive immune systems that are likely to react to things that cause allergies. The immune system is the body's way of fighting infection. There is no cure for atopic dermatitis, but you may be able to control it with care at home. Follow-up care is a key part of your treatment and safety. Be sure to make and go to all appointments, and call your doctor if you are having problems. It's also a good idea to know your test results and keep a list of the medicines you take. How can you care for yourself at home? · Use moisturizer at least twice a day. · If your doctor prescribes a cream, use it as directed. If your doctor prescribes other medicine, take it exactly as directed. · Wash the affected area with water only. Soap can make dryness and itching worse. Pat dry. · Apply a moisturizer after bathing. Use a cream such as Lubriderm, Moisturel, or Cetaphil that does not irritate the skin or cause a rash. Apply the cream while your skin is still damp after lightly drying with a towel. · Use cold, wet cloths to reduce itching. · Keep cool, and stay out of the sun. · If itching affects your normal activities, an over-the-counter antihistamine, such as diphenhydramine (Benadryl) or loratadine (Claritin) may help. Read and follow all instructions on the label. When should you call for help? Call your doctor now or seek immediate medical care if:    · Your rash gets worse and you have a fever.     · You have new blisters or bruises, or the rash spreads and looks like a sunburn.     · You have signs of infection, such as:  ? Increased pain, swelling, warmth, or redness. ? Red streaks leading from the rash. ? Pus draining from the rash. ? A fever.     · You have crusting or oozing sores.     · You have joint aches or body aches along with your rash. Watch closely for changes in your health, and be sure to contact your doctor if:    · Your rash does not clear up after 2 to 3 weeks of home treatment.     · Itching interferes with your sleep or daily activities. Where can you learn more? Go to https://Inf[employer]ous.[employer]. org and sign in to your [employer] account. Enter T586 in the "[employer] box to learn more about \"Atopic Dermatitis: Care Instructions. \"     If you do not have an account, please click on the \"Sign Up Now\" link. Current as of: July 2, 2020               Content Version: 12.6  © 8669-6055 [employer]. Care instructions adapted under license by [facility] ([facility]). If you have questions about a medical condition or this instruction, always ask your healthcare professional. [place] [address] any warranty or liability for your use of this information.

## 2021-03-08 ENCOUNTER — TELEPHONE (OUTPATIENT)
Dept: FAMILY MEDICINE CLINIC | Age: 76
End: 2021-03-08

## 2021-03-08 DIAGNOSIS — R91.1 LUNG NODULE: Primary | ICD-10-CM

## 2021-03-08 NOTE — TELEPHONE ENCOUNTER
----- Message from [id number] [facility] sent at 3/8/2021  2:33 PM EST -----  Subject: Message to Provider    QUESTIONS  Information for Provider? Patient had a CT scan 12/2/20 and was told to   come back in three months for another one. Doesn't have an order in his   chart for one and would like to know if Dr. Rod Angelo would like him to   still come in for one. Would like a call back. ---------------------------------------------------------------------------  --------------  Adi Crary INFO  What is the best way for the office to contact you? OK to leave message on   voicemail  Preferred Call Back Phone Number? [phone]  ---------------------------------------------------------------------------  --------------  SCRIPT ANSWERS  Relationship to Patient?  Self

## 2021-03-11 ENCOUNTER — HOSPITAL ENCOUNTER (OUTPATIENT)
Dept: CT IMAGING | Age: 76
Discharge: HOME | End: 2021-03-11
Payer: MEDICARE

## 2021-03-11 DIAGNOSIS — R91.1 LUNG NODULE: ICD-10-CM

## 2021-03-11 PROCEDURE — 71250 CT THORAX DX C-: CPT

## 2021-05-26 ENCOUNTER — HOSPITAL ENCOUNTER (OUTPATIENT)
Age: 76
Discharge: HOME | End: 2021-05-26
Payer: MEDICARE

## 2021-05-26 DIAGNOSIS — Z12.5 SCREENING PSA (PROSTATE SPECIFIC ANTIGEN): ICD-10-CM

## 2021-05-26 DIAGNOSIS — I10 ESSENTIAL HYPERTENSION: ICD-10-CM

## 2021-05-26 LAB
A/G RATIO: 1.7 (ref 1.1–2.2)
ALBUMIN SERPL-MCNC: 4.2 G/DL (ref 3.4–5)
ALP BLD-CCNC: 49 U/L (ref 40–129)
ALT SERPL-CCNC: 7 U/L (ref 10–40)
ANION GAP SERPL CALCULATED.3IONS-SCNC: 12 MMOL/L (ref 3–16)
AST SERPL-CCNC: 15 U/L (ref 15–37)
BILIRUB SERPL-MCNC: 0.9 MG/DL (ref 0–1)
BUN BLDV-MCNC: 11 MG/DL (ref 7–20)
CALCIUM SERPL-MCNC: 8.9 MG/DL (ref 8.3–10.6)
CHLORIDE BLD-SCNC: 96 MMOL/L (ref 99–110)
CHOLESTEROL, TOTAL: 161 MG/DL (ref 0–199)
CO2: 25 MMOL/L (ref 21–32)
CREAT SERPL-MCNC: 0.8 MG/DL (ref 0.8–1.3)
GFR AFRICAN AMERICAN: >60
GFR NON-AFRICAN AMERICAN: >60
GLOBULIN: 2.5 G/DL
GLUCOSE BLD-MCNC: 87 MG/DL (ref 70–99)
HDLC SERPL-MCNC: 68 MG/DL (ref 40–60)
LDL CHOLESTEROL CALCULATED: 85 MG/DL
POTASSIUM SERPL-SCNC: 3.9 MMOL/L (ref 3.5–5.1)
PROSTATE SPECIFIC ANTIGEN: 0.61 NG/ML (ref 0–4)
SODIUM BLD-SCNC: 133 MMOL/L (ref 136–145)
T4 FREE: 1.5 NG/DL (ref 0.9–1.8)
TOTAL PROTEIN: 6.7 G/DL (ref 6.4–8.2)
TRIGL SERPL-MCNC: 41 MG/DL (ref 0–150)
VLDLC SERPL CALC-MCNC: 8 MG/DL

## 2021-05-26 PROCEDURE — 84153 ASSAY OF PSA TOTAL: CPT

## 2021-05-26 PROCEDURE — 80053 COMPREHEN METABOLIC PANEL: CPT

## 2021-05-26 PROCEDURE — 84439 ASSAY OF FREE THYROXINE: CPT

## 2021-05-26 PROCEDURE — 80061 LIPID PANEL: CPT

## 2021-05-26 PROCEDURE — 36415 COLL VENOUS BLD VENIPUNCTURE: CPT

## 2021-06-01 ENCOUNTER — OFFICE VISIT (OUTPATIENT)
Dept: FAMILY MEDICINE CLINIC | Age: 76
End: 2021-06-01
Payer: MEDICARE

## 2021-06-01 VITALS
SYSTOLIC BLOOD PRESSURE: 120 MMHG | WEIGHT: 199 LBS | DIASTOLIC BLOOD PRESSURE: 66 MMHG | HEART RATE: 74 BPM | BODY MASS INDEX: 28.55 KG/M2 | OXYGEN SATURATION: 97 % | TEMPERATURE: 97.2 F

## 2021-06-01 DIAGNOSIS — I10 ESSENTIAL HYPERTENSION: Primary | ICD-10-CM

## 2021-06-01 DIAGNOSIS — E78.00 HYPERCHOLESTEROLEMIA: ICD-10-CM

## 2021-06-01 DIAGNOSIS — N40.1 BENIGN NON-NODULAR PROSTATIC HYPERPLASIA WITH LOWER URINARY TRACT SYMPTOMS: ICD-10-CM

## 2021-06-01 DIAGNOSIS — G45.9 TIA (TRANSIENT ISCHEMIC ATTACK): ICD-10-CM

## 2021-06-01 DIAGNOSIS — R53.83 FATIGUE, UNSPECIFIED TYPE: ICD-10-CM

## 2021-06-01 PROCEDURE — 1036F TOBACCO NON-USER: CPT | Performed by: FAMILY MEDICINE

## 2021-06-01 PROCEDURE — 4040F PNEUMOC VAC/ADMIN/RCVD: CPT | Performed by: FAMILY MEDICINE

## 2021-06-01 PROCEDURE — 3017F COLORECTAL CA SCREEN DOC REV: CPT | Performed by: FAMILY MEDICINE

## 2021-06-01 PROCEDURE — 99214 OFFICE O/P EST MOD 30 MIN: CPT | Performed by: FAMILY MEDICINE

## 2021-06-01 PROCEDURE — G8417 CALC BMI ABV UP PARAM F/U: HCPCS | Performed by: FAMILY MEDICINE

## 2021-06-01 PROCEDURE — 1123F ACP DISCUSS/DSCN MKR DOCD: CPT | Performed by: FAMILY MEDICINE

## 2021-06-01 PROCEDURE — G8427 DOCREV CUR MEDS BY ELIG CLIN: HCPCS | Performed by: FAMILY MEDICINE

## 2021-06-01 RX ORDER — DOXAZOSIN 2 MG/1
TABLET ORAL
Qty: 90 TABLET | Refills: 3 | Status: SHIPPED | OUTPATIENT
Start: 2021-06-01 | End: 2022-05-16 | Stop reason: SDUPTHER

## 2021-06-01 RX ORDER — TRIAMTERENE AND HYDROCHLOROTHIAZIDE 37.5; 25 MG/1; MG/1
TABLET ORAL
Qty: 90 TABLET | Refills: 3 | Status: SHIPPED | OUTPATIENT
Start: 2021-06-01 | End: 2022-05-16 | Stop reason: SDUPTHER

## 2021-06-01 ASSESSMENT — PATIENT HEALTH QUESTIONNAIRE - PHQ9
SUM OF ALL RESPONSES TO PHQ9 QUESTIONS 1 & 2: 0
SUM OF ALL RESPONSES TO PHQ QUESTIONS 1-9: 0
1. LITTLE INTEREST OR PLEASURE IN DOING THINGS: 0
2. FEELING DOWN, DEPRESSED OR HOPELESS: 0
SUM OF ALL RESPONSES TO PHQ QUESTIONS 1-9: 0
SUM OF ALL RESPONSES TO PHQ QUESTIONS 1-9: 0

## 2021-06-01 NOTE — PATIENT INSTRUCTIONS
Patient Education        Knee Arthritis: Care Instructions  Your Care Instructions     Knee arthritis is a breakdown of the cartilage that cushions your knee joint. When the cartilage wears down, your bones rub against each other. This causes pain and stiffness. Knee arthritis tends to get worse with time. Treatment for knee arthritis involves reducing pain, making the leg muscles stronger, and staying at a healthy body weight. The treatment usually does not improve the health of the cartilage, but it can reduce pain and improve how well your knee works. You can take simple measures to protect your knee joints, ease your pain, and help you stay active. Follow-up care is a key part of your treatment and safety. Be sure to make and go to all appointments, and call your doctor if you are having problems. It's also a good idea to know your test results and keep a list of the medicines you take. How can you care for yourself at home? · Know that knee arthritis will cause more pain on some days than on others. · Stay at a healthy weight. Lose weight if you are overweight. When you stand up, the pressure on your knees from every pound of body weight is multiplied four times. So if you lose 10 pounds, you will reduce the pressure on your knees by 40 pounds. · Talk to your doctor or physical therapist about exercises that will help ease joint pain. ? Stretch to help prevent stiffness and to prevent injury before you exercise. You may enjoy gentle forms of yoga to help keep your knee joints and muscles flexible. ? Walk instead of jog.  ? Ride a bike. This makes your thigh muscles stronger and takes pressure off your knee. ? Wear well-fitting and comfortable shoes. ? Exercise in chest-deep water. This can help you exercise longer with less pain. ? Avoid exercises that include squatting or kneeling. They can put a lot of strain on your knees.   ? Talk to your doctor to make sure that the exercise you do is not making the arthritis worse. · Do not sit for long periods of time. Try to walk once in a while to keep your knee from getting stiff. · Ask your doctor or physical therapist whether shoe inserts may reduce your arthritis pain. · If you can afford it, get new athletic shoes at least every year. This can help reduce the strain on your knees. · Use a device to help you do everyday activities. ? A cane or walking stick can help you keep your balance when you walk. Hold the cane or walking stick in the hand opposite the painful knee. ? If you feel like you may fall when you walk, try using crutches or a front-wheeled walker. These can prevent falls that could cause more damage to your knee. ? A knee brace may help keep your knee stable and prevent pain. ? You also can use other things to make life easier, such as a higher toilet seat and handrails in the bathtub or shower. · Take pain medicines exactly as directed. ? Do not wait until you are in severe pain. You will get better results if you take it sooner. ? If you are not taking a prescription pain medicine, take an over-the-counter medicine such as acetaminophen (Tylenol), ibuprofen (Advil, Motrin), or naproxen (Aleve). Read and follow all instructions on the label. ? Do not take two or more pain medicines at the same time unless the doctor told you to. Many pain medicines have acetaminophen, which is Tylenol. Too much acetaminophen (Tylenol) can be harmful. ? Tell your doctor if you take a blood thinner, have diabetes, or have allergies to shellfish. · Ask your doctor if you might benefit from a shot of steroid medicine into your knee. This may provide pain relief for several months. · Many people take the supplements glucosamine and chondroitin for osteoarthritis. Some people feel they help, but the medical research does not show that they work. Talk to your doctor before you take these supplements. When should you call for help?    Call your doctor now or seek immediate medical care if:    · You have sudden swelling, warmth, or pain in your knee.     · You have knee pain and a fever or rash.     · You have such bad pain that you cannot use your knee. Watch closely for changes in your health, and be sure to contact your doctor if you have any problems. Where can you learn more? Go to https://Busbudpepiceweb.[employer]. org and sign in to your [employer] account. Enter G312 in the [employer] box to learn more about \"Knee Arthritis: Care Instructions. \"     If you do not have an account, please click on the \"Sign Up Now\" link. Current as of: August 5, 2020               Content Version: 12.8  © 2006-2021 [employer]. Care instructions adapted under license by [facility] ([facility]). If you have questions about a medical condition or this instruction, always ask your healthcare professional. [address] any warranty or liability for your use of this information.

## 2021-06-01 NOTE — PROGRESS NOTES
Subjective:      Patient ID: Alex Bishop is a 77 y.o. male. CC: Patient presents for re-evaluation of chronic health problems including hypertension hyperlipidemia history of TIA BPH and fatigue. HPI Patient presents today for a follow-up on chronic medications and medical conditions. Patient has been having issues with not having enough energy. Patient does typically feels drained. He states in the last year with the Covid pandemic has not been monitoring and exercising is really not on account of exercise at all. He states that he does some activity he becomes more wore out rather quickly. He just feels down energy level wise. He is sleeping well. There have been no change of his medications. Eczema problems have improved with the cortisone cream.    Review of Systems     Patient Active Problem List   Diagnosis    TIA (transient ischemic attack)    Hypertension    Benign non-nodular prostatic hyperplasia with lower urinary tract symptoms    Hypercholesterolemia    Primary osteoarthritis involving multiple joints    Sensorineural hearing loss (SNHL) of both ears       Outpatient Medications Marked as Taking for the 6/1/21 encounter (Office Visit) with Neftali Osborn MD   Medication Sig Dispense Refill    clobetasol (TEMOVATE) 0.05 % cream Apply topically 1 times daily. 60 g 5    b complex vitamins capsule Take 1 capsule by mouth daily      doxazosin (CARDURA) 2 MG tablet TAKE 1 TABLET BY MOUTH EVERY DAY 90 tablet 3    triamterene-hydroCHLOROthiazide (MAXZIDE-25) 37.5-25 MG per tablet TAKE 1 TABLET BY MOUTH EVERY DAY 90 tablet 3    fluorouracil (EFUDEX) 5 % cream Apply topically 2 times daily. (Patient taking differently: as needed Apply topically 2 times daily.) 40 g 0    Red Yeast Rice Extract 600 MG CAPS Take 2 capsules by mouth daily.  aspirin 81 MG EC tablet Take 81 mg by mouth daily.            Allergies   Allergen Reactions    Shellfish-Derived Products Swelling     CRAB Social History     Tobacco Use    Smoking status: Former Smoker     Packs/day: 0.50     Years: 50.00     Pack years: 25.00     Start date:      Quit date: 2015     Years since quittin.7    Smokeless tobacco: Never Used   Substance Use Topics    Alcohol use: Yes     Alcohol/week: 42.0 standard drinks     Types: 42 Cans of beer per week     Comment: 6 beers daily       /66 (Site: Left Upper Arm, Position: Sitting, Cuff Size: Medium Adult)   Pulse 74   Temp 97.2 °F (36.2 °C) (Infrared)   Wt 199 lb (90.3 kg)   SpO2 97%   BMI 28.55 kg/m²       Objective:   Physical Exam  Constitutional:       General: He is not in acute distress. Appearance: He is well-developed. Neck:      Vascular: No carotid bruit. Cardiovascular:      Rate and Rhythm: Normal rate and regular rhythm. Pulses:           Dorsalis pedis pulses are 2+ on the right side and 2+ on the left side. Posterior tibial pulses are 2+ on the right side and 2+ on the left side. Heart sounds: Normal heart sounds. No murmur heard. No friction rub. No gallop. Pulmonary:      Effort: Pulmonary effort is normal.      Breath sounds: Normal breath sounds. Musculoskeletal:         General: No tenderness. Normal range of motion. Right lower leg: No edema. Left lower leg: No edema. Neurological:      Mental Status: He is alert and oriented to person, place, and time. Mental status is at baseline. Sensory: Sensation is intact. Motor: Motor function is intact. Psychiatric:         Mood and Affect: Mood normal.         Behavior: Behavior normal. Behavior is cooperative. Thought Content: Thought content normal.         Judgment: Judgment normal.         Assessment:      [facility] was seen today for 6 month follow-up and hypertension.     Diagnoses and all orders for this visit:    Essential hypertension  -     triamterene-hydroCHLOROthiazide (MAXZIDE-25) 37.5-25 MG per tablet; TAKE 1 TABLET BY MOUTH EVERY DAY  -     Basic Metabolic Panel; Future    Hypercholesterolemia    TIA (transient ischemic attack)    Benign non-nodular prostatic hyperplasia with lower urinary tract symptoms    Fatigue, unspecified type    Other orders  -     doxazosin (CARDURA) 2 MG tablet; TAKE 1 TABLET BY MOUTH EVERY DAY            Plan:      Pt appears medically stable & reviewed labs with patient. No change in current medications. Fatigue is probably secondary to decreased activities and recommend he start an exercise program such as walking for 30 to 40 minutes every day. Patient received counseling on the following healthy behaviors: nutrition and exercise     Patient given educational materials     Health maintenance updated    Discussed use, benefit, and side effects of prescribed medications. Barriers to medication compliance addressed. All patient questions answered. Pt voiced understanding. Patient needs RTC in 6 months. Medical decision making of moderate complexity. Please note that this chart was generated using Dragon dictation software. Although every effort was made to ensure the accuracy of this automated transcription, some errors in transcription may have occurred.

## 2021-06-14 ENCOUNTER — TELEPHONE (OUTPATIENT)
Dept: FAMILY MEDICINE CLINIC | Age: 76
End: 2021-06-14

## 2021-06-14 RX ORDER — VALSARTAN 40 MG/1
40 TABLET ORAL DAILY
Qty: 30 TABLET | Refills: 3 | Status: SHIPPED | OUTPATIENT
Start: 2021-06-14 | End: 2021-10-04

## 2021-06-14 NOTE — TELEPHONE ENCOUNTER
I sent a prescription for valsartan 40 mg daily to the pharmacy.   Tell him to call us in 1 month with an update of blood pressure

## 2021-06-14 NOTE — TELEPHONE ENCOUNTER
Left message for pt to call - need to know some blood pressure readings to see how high they have been

## 2021-06-14 NOTE — TELEPHONE ENCOUNTER
Pt is responding to Vm left for Bp     The evening reading are running from 160/85 to 180/85   Lower number ranging from 85 to 89 Please advise.

## 2021-06-14 NOTE — TELEPHONE ENCOUNTER
----- Message from [facility] sent at 6/14/2021 10:12 AM EDT -----  Subject: Message to Provider    QUESTIONS  Information for Provider? Pt was told to monitor blood pressure for the   past two weeks, still on high side, was told to let  know so he can put   him back on the lisinopril.   ---------------------------------------------------------------------------  --------------  CALL BACK INFO  What is the best way for the office to contact you? OK to leave message on   voicemail  Preferred Call Back Phone Number? [phone]  ---------------------------------------------------------------------------  --------------  SCRIPT ANSWERS  Relationship to Patient?  Self

## 2021-07-23 ENCOUNTER — OFFICE VISIT (OUTPATIENT)
Dept: FAMILY MEDICINE CLINIC | Age: 76
End: 2021-07-23
Payer: MEDICARE

## 2021-07-23 ENCOUNTER — NURSE TRIAGE (OUTPATIENT)
Dept: OTHER | Facility: CLINIC | Age: 76
End: 2021-07-23

## 2021-07-23 VITALS — HEART RATE: 81 BPM | TEMPERATURE: 99.5 F | OXYGEN SATURATION: 98 %

## 2021-07-23 DIAGNOSIS — J40 BRONCHITIS: Primary | ICD-10-CM

## 2021-07-23 PROCEDURE — 3017F COLORECTAL CA SCREEN DOC REV: CPT | Performed by: NURSE PRACTITIONER

## 2021-07-23 PROCEDURE — 99213 OFFICE O/P EST LOW 20 MIN: CPT | Performed by: NURSE PRACTITIONER

## 2021-07-23 PROCEDURE — 4040F PNEUMOC VAC/ADMIN/RCVD: CPT | Performed by: NURSE PRACTITIONER

## 2021-07-23 PROCEDURE — 1123F ACP DISCUSS/DSCN MKR DOCD: CPT | Performed by: NURSE PRACTITIONER

## 2021-07-23 PROCEDURE — 1036F TOBACCO NON-USER: CPT | Performed by: NURSE PRACTITIONER

## 2021-07-23 PROCEDURE — G8417 CALC BMI ABV UP PARAM F/U: HCPCS | Performed by: NURSE PRACTITIONER

## 2021-07-23 PROCEDURE — G8428 CUR MEDS NOT DOCUMENT: HCPCS | Performed by: NURSE PRACTITIONER

## 2021-07-23 RX ORDER — BENZONATATE 100 MG/1
100-200 CAPSULE ORAL 3 TIMES DAILY PRN
Qty: 60 CAPSULE | Refills: 0 | Status: SHIPPED | OUTPATIENT
Start: 2021-07-23 | End: 2021-07-30

## 2021-07-23 NOTE — PROGRESS NOTES
bilaterally  [] TM Red  [] Right [] Left [] Bilateral  [] TM Bulging [] Right [] Left []  Billateral    [x] Oropharynx [x] Clear [] Red [] Exudate [] Swollen    [x] No adenopathy [] Adenopathy __________    [x] Lungs clear with good movement and effort  [x] Breathing appears normal     [x] Speaks in complete sentences  [] Appears tachypneic   [] Wheezing           [] Rhonchi   [] Decreased    [x] CV RRR  [] No Murmur  [] Murmur  [] Irregular  [] Tachycardic    [] OTHER:  1}      TESTS ORDERED:    [] POCT FLU  [] POCT STREP  [] COVID-19 Test sent  [] Appointment made at testing clinic for patient to get a COVID test.       TEST RESULTS:    POCT FLU test:  [] Positive  [] Negative  POCT STREP test:  [] Positive  [] Negative    ASSESSMENT:  [] Allergic Rhinitis  [] Asthma Exacerbation  [] Bronchitis  [] COPD Exacerbation  [] Gastroenteritis  [] Influenza  [] Sinusitis  [] Strep Throat [] Sore Throat  [] Viral URI   [] Possible COVID-19   [] Exposure to COVID -19  [] Positive for COVID  [] Screening for Viral Disease (COVID test no sx)        Jordi Mishra was seen today for cough. Diagnoses and all orders for this visit:    Bronchitis  -     benzonatate (TESSALON) 100 MG capsule; Take 1-2 capsules by mouth 3 times daily as needed for Cough              [] Low risk for complications from COVID 19  [x] Moderate risk for complications from COVID 19  [] High risk for complications from COVID 19    PLAN:    [x] Discharge home with written instructions for:  [] Flu management  [] Strep throat management  [] Viral respiratory illness management  [] Sinusitis management  [x] Bronchitis Management  [] Possible COVID-19 infection with self-quarantine and management of symptoms  [] Follow-up with primary care physician or emergency department if worsens  [] Note given for work    [] Referred to emergency department for evaluation    Rosalia PEDRAZA LPN, sandy scribing for and in the presence of FELIX Castillo CNP.  Electronically signed by Alexander Ruff LPN on 4/07/46 at 1:70 PM EDT

## 2021-07-23 NOTE — TELEPHONE ENCOUNTER
Received call from [facility] at [facility] with Red Flag Complaint. Brief description of triage: patient calling with c/o cough and fatigue, unable to sleep lying down. See below assessment. Triage indicates for patient to see PCP in office today or tomorrow, advised patient if unable to get an appointment in the suggested time frame to go to an [facility] or ED, patient agreeable. Care advice provided, patient verbalizes understanding; denies any other questions or concerns; instructed to call back for any new or worsening symptoms. Writer provided warm transfer to [facility] at [facility] for appointment scheduling. Attention Provider: Thank you for allowing me to participate in the care of your patient. The patient was connected to triage in response to information provided to the [facility]. Please do not respond through this encounter as the response is not directed to a shared pool. Reason for Disposition   Patient wants to be seen    Answer Assessment - Initial Assessment Questions  1. ONSET: \"When did the cough begin? \"       Sunday    2. SEVERITY: \"How bad is the cough today? \"       Cough is \"Occasional when vertical, much more prevalent when horizontal\"    3. RESPIRATORY DISTRESS: \"Describe your breathing. \"       \"Breathing is fine\" CT scan showed emphysema a few months ago    4. FEVER: \"Do you have a fever? \" If so, ask: \"What is your temperature, how was it measured, and when did it start? \"      Denies    5. HEMOPTYSIS: \"Are you coughing up any blood? \" If so ask: \"How much? \" (flecks, streaks, tablespoons, etc.)      Denies    6. TREATMENT: \"What have you done so far to treat the cough? \" (e.g., meds, fluids, humidifier)      Coricidin-helps, cough is worse if he doesn't take it    7. CARDIAC HISTORY: \"Do you have any history of heart disease? \" (e.g., heart attack, congestive heart failure)       Denies    8. LUNG HISTORY: \"Do you have any history of lung disease? \"  (e.g., pulmonary embolus, asthma, emphysema)      Emphysema    9. PE RISK FACTORS: \"Do you have a history of blood clots? \" (or: recent major surgery, recent prolonged travel, bedridden)      Denies     10. OTHER SYMPTOMS: \"Do you have any other symptoms? (e.g., runny nose, wheezing, chest pain)        See Covid screen    11. PREGNANCY: \"Is there any chance you are pregnant? \" \"When was your last menstrual period? \"        N/A    12. TRAVEL: \"Have you traveled out of the country in the last month? \" (e.g., travel history, exposures)        [place] last week for four days    Protocols used: COUGH-ADULT-[place]

## 2021-10-04 RX ORDER — VALSARTAN 40 MG/1
TABLET ORAL
Qty: 90 TABLET | Refills: 1 | Status: SHIPPED | OUTPATIENT
Start: 2021-10-04 | End: 2022-04-04

## 2021-11-09 ENCOUNTER — HOSPITAL ENCOUNTER (OUTPATIENT)
Age: 76
Discharge: HOME | End: 2021-11-09
Payer: MEDICARE

## 2021-11-09 DIAGNOSIS — I10 ESSENTIAL HYPERTENSION: ICD-10-CM

## 2021-11-09 LAB
ANION GAP SERPL CALCULATED.3IONS-SCNC: 11 MMOL/L (ref 3–16)
BUN BLDV-MCNC: 7 MG/DL (ref 7–20)
CALCIUM SERPL-MCNC: 8.7 MG/DL (ref 8.3–10.6)
CHLORIDE BLD-SCNC: 96 MMOL/L (ref 99–110)
CO2: 24 MMOL/L (ref 21–32)
CREAT SERPL-MCNC: 0.6 MG/DL (ref 0.8–1.3)
GFR AFRICAN AMERICAN: >60
GFR NON-AFRICAN AMERICAN: >60
GLUCOSE BLD-MCNC: 91 MG/DL (ref 70–99)
POTASSIUM SERPL-SCNC: 4.1 MMOL/L (ref 3.5–5.1)
SODIUM BLD-SCNC: 131 MMOL/L (ref 136–145)

## 2021-11-09 PROCEDURE — 36415 COLL VENOUS BLD VENIPUNCTURE: CPT

## 2021-11-09 PROCEDURE — 80048 BASIC METABOLIC PNL TOTAL CA: CPT

## 2021-11-16 ENCOUNTER — OFFICE VISIT (OUTPATIENT)
Dept: FAMILY MEDICINE CLINIC | Age: 76
End: 2021-11-16
Payer: MEDICARE

## 2021-11-16 VITALS
HEIGHT: 70 IN | HEART RATE: 81 BPM | WEIGHT: 206 LBS | TEMPERATURE: 97.4 F | SYSTOLIC BLOOD PRESSURE: 112 MMHG | BODY MASS INDEX: 29.49 KG/M2 | DIASTOLIC BLOOD PRESSURE: 62 MMHG | OXYGEN SATURATION: 98 %

## 2021-11-16 DIAGNOSIS — Z12.11 SCREENING FOR COLON CANCER: ICD-10-CM

## 2021-11-16 DIAGNOSIS — I10 PRIMARY HYPERTENSION: ICD-10-CM

## 2021-11-16 DIAGNOSIS — Z12.5 SCREENING PSA (PROSTATE SPECIFIC ANTIGEN): ICD-10-CM

## 2021-11-16 DIAGNOSIS — H90.3 SENSORINEURAL HEARING LOSS (SNHL) OF BOTH EARS: ICD-10-CM

## 2021-11-16 DIAGNOSIS — Z00.00 ROUTINE GENERAL MEDICAL EXAMINATION AT A HEALTH CARE FACILITY: Primary | ICD-10-CM

## 2021-11-16 DIAGNOSIS — M15.9 PRIMARY OSTEOARTHRITIS INVOLVING MULTIPLE JOINTS: ICD-10-CM

## 2021-11-16 DIAGNOSIS — Z23 NEED FOR INFLUENZA VACCINATION: ICD-10-CM

## 2021-11-16 PROCEDURE — 1123F ACP DISCUSS/DSCN MKR DOCD: CPT | Performed by: FAMILY MEDICINE

## 2021-11-16 PROCEDURE — 90694 VACC AIIV4 NO PRSRV 0.5ML IM: CPT | Performed by: FAMILY MEDICINE

## 2021-11-16 PROCEDURE — G8484 FLU IMMUNIZE NO ADMIN: HCPCS | Performed by: FAMILY MEDICINE

## 2021-11-16 PROCEDURE — 3017F COLORECTAL CA SCREEN DOC REV: CPT | Performed by: FAMILY MEDICINE

## 2021-11-16 PROCEDURE — G0008 ADMIN INFLUENZA VIRUS VAC: HCPCS | Performed by: FAMILY MEDICINE

## 2021-11-16 PROCEDURE — 4040F PNEUMOC VAC/ADMIN/RCVD: CPT | Performed by: FAMILY MEDICINE

## 2021-11-16 PROCEDURE — G0439 PPPS, SUBSEQ VISIT: HCPCS | Performed by: FAMILY MEDICINE

## 2021-11-16 ASSESSMENT — LIFESTYLE VARIABLES
HOW OFTEN DURING THE LAST YEAR HAVE YOU HAD A FEELING OF GUILT OR REMORSE AFTER DRINKING: 0
AUDIT-C TOTAL SCORE: 1
HOW OFTEN DURING THE LAST YEAR HAVE YOU NEEDED AN ALCOHOLIC DRINK FIRST THING IN THE MORNING TO GET YOURSELF GOING AFTER A NIGHT OF HEAVY DRINKING: 0
HAVE YOU OR SOMEONE ELSE BEEN INJURED AS A RESULT OF YOUR DRINKING: 0
HOW OFTEN DURING THE LAST YEAR HAVE YOU BEEN UNABLE TO REMEMBER WHAT HAPPENED THE NIGHT BEFORE BECAUSE YOU HAD BEEN DRINKING: 0
AUDIT TOTAL SCORE: 1
HOW OFTEN DURING THE LAST YEAR HAVE YOU FAILED TO DO WHAT WAS NORMALLY EXPECTED FROM YOU BECAUSE OF DRINKING: 0
HOW OFTEN DURING THE LAST YEAR HAVE YOU FOUND THAT YOU WERE NOT ABLE TO STOP DRINKING ONCE YOU HAD STARTED: 0
HOW OFTEN DO YOU HAVE SIX OR MORE DRINKS ON ONE OCCASION: 0
HOW MANY STANDARD DRINKS CONTAINING ALCOHOL DO YOU HAVE ON A TYPICAL DAY: 0
HAS A RELATIVE, FRIEND, DOCTOR, OR ANOTHER HEALTH PROFESSIONAL EXPRESSED CONCERN ABOUT YOUR DRINKING OR SUGGESTED YOU CUT DOWN: 0
HOW OFTEN DO YOU HAVE A DRINK CONTAINING ALCOHOL: 1

## 2021-11-16 ASSESSMENT — PATIENT HEALTH QUESTIONNAIRE - PHQ9
SUM OF ALL RESPONSES TO PHQ QUESTIONS 1-9: 0
SUM OF ALL RESPONSES TO PHQ QUESTIONS 1-9: 0
2. FEELING DOWN, DEPRESSED OR HOPELESS: 0
SUM OF ALL RESPONSES TO PHQ QUESTIONS 1-9: 0
SUM OF ALL RESPONSES TO PHQ9 QUESTIONS 1 & 2: 0
1. LITTLE INTEREST OR PLEASURE IN DOING THINGS: 0

## 2021-11-16 NOTE — PATIENT INSTRUCTIONS
Personalized Preventive Plan for Walter Green - 11/16/2021  Medicare offers a range of preventive health benefits. Some of the tests and screenings are paid in full while other may be subject to a deductible, co-insurance, and/or copay. Some of these benefits include a comprehensive review of your medical history including lifestyle, illnesses that may run in your family, and various assessments and screenings as appropriate. After reviewing your medical record and screening and assessments performed today your provider may have ordered immunizations, labs, imaging, and/or referrals for you. A list of these orders (if applicable) as well as your Preventive Care list are included within your After Visit Summary for your review. Other Preventive Recommendations:    · A preventive eye exam performed by an eye specialist is recommended every 1-2 years to screen for glaucoma; cataracts, macular degeneration, and other eye disorders. · A preventive dental visit is recommended every 6 months. · Try to get at least 150 minutes of exercise per week or 10,000 steps per day on a pedometer . · Order or download the FREE \"Exercise & Physical Activity: Your Everyday Guide\" from [employer]. Call 5-757.939.2358 or search [employer] online. · You need 3910-7163 mg of calcium and 8492-3321 IU of vitamin D per day. It is possible to meet your calcium requirement with diet alone, but a vitamin D supplement is usually necessary to meet this goal.  · When exposed to the sun, use a sunscreen that protects against both UVA and UVB radiation with an SPF of 30 or greater. Reapply every 2 to 3 hours or after sweating, drying off with a towel, or swimming. · Always wear a seat belt when traveling in a car. Always wear a helmet when riding a bicycle or motorcycle.

## 2021-11-16 NOTE — PROGRESS NOTES
Immunization(s) given during visit:     Immunizations Administered     Name Date Dose Route    Influenza, Quadv, adjuvanted, 65 yrs +, IM, PF (Fluad) 11/16/2021 0.5 mL Intramuscular    Site: Deltoid- Left    Lot: [id number]    NDC: 37817-130-18           Patient instructed to remain in clinic for 20 minutes after injection and was advised to report any adverse reaction to me immediately.

## 2021-11-16 NOTE — PROGRESS NOTES
Medicare Annual Wellness Visit  Name: Catie Daniels Date: 2021   MRN: [id number] Sex: Male   Age: 77 y.o. Ethnicity: Non- / Non    : 1945 Race: White (non-)      Dave Lutz is here for Medicare AWV    CC: Patient presents for AMV and follow-up of chronic health problems  Patient overall feels he is unchanged from last appointment time. He still continues decreased energy and he has been doing some exercise which does seem to help out to some extent. He plans to be going to [place] for the next 3 weeks and believes a change of scenery would be beneficial.  He denies any chest pain or shortness of breath with activities. He has had no further TIA symptoms. He feels he is having more arthritis symptoms in other joints especially his knees. Normally does take a medication that regards. Patient is very compliant with medications with no adverse reactions. Patient continue using hearing aids    Screenings for behavioral, psychosocial and functional/safety risks, and cognitive dysfunction are all negative except as indicated below. These results, as well as other patient data from the [address] form, are documented in Flowsheets linked to this Encounter. Allergies   Allergen Reactions    Shellfish-Derived Products Swelling     CRAB       Prior to Visit Medications    Medication Sig Taking? Authorizing Provider   valsartan (DIOVAN) 40 MG tablet TAKE 1 TABLET BY MOUTH EVERY DAY Yes Owen Maradiaga MD   triamterene-hydroCHLOROthiazide (MAXZIDE-25) 37.5-25 MG per tablet TAKE 1 TABLET BY MOUTH EVERY DAY Yes Owen Maradiaga MD   clobetasol (TEMOVATE) 0.05 % cream Apply topically 1 times daily. Patient taking differently: Apply topically 1 times daily prn Yes Owen Maradiaga MD   b complex vitamins capsule Take 1 capsule by mouth daily Yes Historical Provider, MD   Red Yeast Rice Extract 600 MG CAPS Take 2 capsules by mouth daily.  Yes Historical Provider, MD   aspirin 81 MG EC tablet Take 81 mg by mouth daily. Yes Historical Provider, MD   doxazosin (CARDURA) 2 MG tablet TAKE 1 TABLET BY MOUTH EVERY DAY  Nayely Myers MD       Past Medical History:   Diagnosis Date    Enlarged prostate     Hypercholesterolemia 9/8/2014    Hypertension     Hypertrophy of prostate without urinary obstruction and other lower urinary tract symptoms (LUTS) 3/5/2013    Irregular heart beat     Tobacco abuse 7/19/2011    Unspecified transient cerebral ischemia        No past surgical history on file. Family History   Problem Relation Age of Onset    High Blood Pressure Mother     Heart Disease Mother     Alzheimer's Disease Father     Prostate Cancer Father     Heart Disease Brother        CareTeam (Including outside providers/suppliers regularly involved in providing care):   Patient Care Team:  Nayely Myers MD as PCP - General  Nayely Myers MD as PCP - [facility] Provider    Wt Readings from Last 3 Encounters:   11/16/21 206 lb (93.4 kg)   06/01/21 199 lb (90.3 kg)   01/27/21 200 lb (90.7 kg)     Vitals:    11/16/21 0800   BP: 112/62   Site: Left Upper Arm   Position: Sitting   Cuff Size: Medium Adult   Pulse: 81   Temp: 97.4 °F (36.3 °C)   TempSrc: Infrared   SpO2: 98%   Weight: 206 lb (93.4 kg)   Height: 5' 10.25\" (1.784 m)     Body mass index is 29.35 kg/m². Based upon direct observation of the patient, evaluation of cognition reveals recent and remote memory intact.     General Appearance: alert and oriented to person, place and time, well-developed and well-nourished, in no acute distress  Skin: no suspicious lesions noted  Neck: neck supple and non tender without mass, no thyromegaly or thyroid nodules, no cervical lymphadenopathy   Pulmonary/Chest: clear to auscultation bilaterally- no wheezes, rales or rhonchi, normal air movement, no respiratory distress  Cardiovascular: normal rate, regular rhythm, normal S1 and S2, no murmurs, no gallops, intact distal pulses and no carotid bruits  Abdomen: soft, non-tender, non-distended, normal bowel sounds, no masses or organomegaly  Extremities: no edema  Neurologic: no cranial nerve deficit, gait and coordination normal and speech normal    Patient's complete Health Risk Assessment and screening values have been reviewed and are found in Flowsheets. The following problems were reviewed today and where indicated follow up appointments were made and/or referrals ordered. Positive Risk Factor Screenings with Interventions:          General Health and ACP:  General  In general, how would you say your health is?: Very Good  In the past 7 days, have you experienced any of the following?  New or Increased Pain, New or Increased Fatigue, Loneliness, Social Isolation, Stress or Anger?: None of These  Do you get the social and emotional support that you need?: Yes  Do you have a Living Will?: Yes  Advance Directives     Power of [address] [place] [address] Will ACP-Advance Directive ACP-Power of     Not on File Not on File Not on File Not on File      General Health Risk Interventions:  · Recommend patient bring in copy of living will to the office    Health Habits/Nutrition:  Health Habits/Nutrition  Do you exercise for at least 20 minutes 2-3 times per week?: (!) No  Have you lost any weight without trying in the past 3 months?: No  Do you eat only one meal per day?: No  Have you seen the dentist within the past year?: Appointment is scheduled  Body mass index: (!) 29.34  Health Habits/Nutrition Interventions:  · Inadequate physical activity:  educational materials provided to promote increased physical activity     Safety:  Safety  Do you have working smoke detectors?: Yes  Have all throw rugs been removed or fastened?: Yes  Do you have non-slip mats or surfaces in all bathtubs/showers?: Yes  Do all of your stairways have a railing or banister?:  (no stairs in home)  Are your doorways, halls and stairs free of clutter?: (!) No  Safety Interventions:  · Home safety tips provided     Personalized Preventive Plan   Current Health Maintenance Status  Immunization History   Administered Date(s) Administered    COVID-19, Rodriguez Peter, PF, 30mcg/0.3mL 02/04/2021, 02/25/2021, 10/10/2021    Influenza Vaccine, unspecified formulation 11/18/2014, 09/30/2015    Influenza Virus Vaccine 09/10/2012    Influenza, High Dose (Fluzone 65 yrs and older) 09/25/2013, 09/30/2015, 09/21/2016, 09/27/2017, 10/01/2018, 11/09/2019    Influenza, High-dose, Quadv, 65 yrs +, IM (Fluzone) 09/23/2020    Pneumococcal Conjugate 13-valent ([id number]) 03/16/2015    Pneumococcal Polysaccharide ([id number]) 03/25/2014    Tdap (Boostrix, Adacel) 03/08/2010        Health Maintenance   Topic Date Due    AAA screen  Never done    Hepatitis C screen  Never done   [employer] Visit (AWV)  Never done    Flu vaccine (1) 09/01/2021    Colon cancer screen colonoscopy  09/11/2021    DTaP/Tdap/Td vaccine (2 - Td or Tdap) 11/16/2022 (Originally 3/8/2020)    Shingles Vaccine (1 of 2) 11/16/2022 (Originally 8/17/1996)    Low dose CT lung screening  03/11/2022    Potassium monitoring  11/09/2022    Creatinine monitoring  11/09/2022    Lipid screen  05/26/2026    Pneumococcal 65+ years Vaccine  Completed    COVID-19 Vaccine  Completed    Hepatitis A vaccine  Aged Out    Hepatitis B vaccine  Aged Out    Hib vaccine  Aged Out    Meningococcal (ACWY) vaccine  Aged Out     Recommendations for [employer] Due: see orders and patient instructions/AVS.  . Recommended screening schedule for the next 5-10 years is provided to the patient in written form: see Patient Instructions/AVS.    Benjamin Ramos was seen today for medicare awv.     Diagnoses and all orders for this visit:    Routine general medical examination at a health care facility    Need for influenza vaccination  -     INFLUENZA, QUADV, ADJUVANTED, 65 YRS =, IM, PF, PREFILL SYR, 0.5ML (FLUAD)    Screening for colon cancer  -     [facility] - Itzel Nair MD, Gastroenterology, [facility]    Primary hypertension  -     Comprehensive Metabolic Panel; Future  -     Lipid Panel; Future    Screening PSA (prostate specific antigen)  -     PSA screening; Future    Primary osteoarthritis involving multiple joints    Sensorineural hearing loss (SNHL) of both ears           Reviewed labs and test results with patient. Maintain current medications. Encourage patient to increase exercise    Patient received counseling on the following healthy behaviors: nutrition and exercise     Patient given educational materials     Health maintenance updated    Discussed use, benefit, and side effects of prescribed medications. Barriers to medication compliance addressed. All patient questions answered. Pt voiced understanding. Patient needs RTC in 6 months. Medical decision making of moderate complexity. Please note that this chart was generated using Dragon dictation software. Although every effort was made to ensure the accuracy of this automated transcription, some errors in transcription may have occurred.

## 2022-04-04 RX ORDER — VALSARTAN 40 MG/1
TABLET ORAL
Qty: 90 TABLET | Refills: 1 | Status: SHIPPED | OUTPATIENT
Start: 2022-04-04 | End: 2022-10-17

## 2022-04-04 NOTE — TELEPHONE ENCOUNTER
Medication:   Requested Prescriptions     Pending Prescriptions Disp Refills    valsartan (DIOVAN) 40 MG tablet [Pharmacy Med Name: VALSARTAN 40 MG TABLET] 90 tablet 1     Sig: TAKE ONE TABLET BY MOUTH DAILY      Last Filled:     Patient Phone Number: [phone] (home)     Last appt: 11/16/2021   Next appt: 5/16/2022    Last OARRS: No flowsheet data found. PDMP Monitoring:    Last PDMP Sherald Spurling as Reviewed [facility]):  Review User Review Instant Review Result          Preferred Pharmacy:   [facility] [place] [address]. - P [phone] - F [phone]  [address] [facility].   [address] [place] [address]  Phone: [phone] Fax: [phone]    [facility] [id number] - [id number] [facility] [phone]  [address] [address]  Phone: [phone] Fax: [phone]

## 2022-04-06 ENCOUNTER — TELEPHONE (OUTPATIENT)
Dept: FAMILY MEDICINE CLINIC | Age: 77
End: 2022-04-06

## 2022-04-06 RX ORDER — ERYTHROMYCIN 5 MG/G
OINTMENT OPHTHALMIC
Qty: 1 G | Refills: 0 | Status: SHIPPED | OUTPATIENT
Start: 2022-04-06 | End: 2022-05-16

## 2022-04-06 NOTE — TELEPHONE ENCOUNTER
Prescription sent to the pharmacy but also recommend for him to use warm compresses 2-3 times a day to the affected eye as well as the antibiotic ointment

## 2022-04-06 NOTE — TELEPHONE ENCOUNTER
Patient called reporting a stye on his left eye.   Does he need to make an appointment or can a medicine be called in?  [phone]    Please advise    [facility] [id number] Kiet Nguyen [address] - P [phone] Yo Dela Cruz [phone]

## 2022-04-07 NOTE — PROGRESS NOTES
Patient __X_ reached   _____not reached-preop instructions left on voice mail_____________      DATE_4/15/22_______ TIME___0930_____ARRIVAL___0800  FEC______      Nothing to eat or drink after midnight the night before,except for what the prep instructions call for. If you do not have the instructions or do not understand them please contact your doctors office. Follow any instructions your doctors office has given you including what medications to take the AM of your procedure and which ones to hold. You may use your inhalers. If you take a long acting insulin the pinky prior please cut the dose in half and take no diabetic medications that AM.Follow specific doctors office instructions regarding blood thinners and if they want you to hold and for how long. If you are on a blood thinner and have no instructions please contact the office and ask. Dress comfortably,bring your insurance card,picture ID,and a complete list of medications, including supplements. You must have a responsible adult to stay with you during the procedure,drive you home and stay with you. [facility] phone number [phone] for any questions. OTHER INSTRUCTIONS(if applicable)_____take cardura, valsartan am of procedure____________________________________________________        VISITOR POLICY(subject to change)    Current visitor policy is 2 visitors per patient. No children allowed. Mask are required. Visiting hours are 8a-8p. Overnight visitors will be at the discretion of the nurse.

## 2022-04-07 NOTE — PROGRESS NOTES
Pt.has stye left eye and is on antibiotic ointment. He will notify Dr. Sol Jason office if this is not better a couple days before procedure.

## 2022-04-15 ENCOUNTER — ANESTHESIA (OUTPATIENT)
Dept: ENDOSCOPY | Age: 77
End: 2022-04-15
Payer: MEDICARE

## 2022-04-15 ENCOUNTER — ANESTHESIA EVENT (OUTPATIENT)
Dept: ENDOSCOPY | Age: 77
End: 2022-04-15
Payer: MEDICARE

## 2022-04-15 ENCOUNTER — HOSPITAL ENCOUNTER (OUTPATIENT)
Age: 77
Setting detail: OUTPATIENT SURGERY
Discharge: HOME | End: 2022-04-15
Attending: INTERNAL MEDICINE | Admitting: INTERNAL MEDICINE
Payer: MEDICARE

## 2022-04-15 VITALS
RESPIRATION RATE: 16 BRPM | HEART RATE: 62 BPM | TEMPERATURE: 97.2 F | WEIGHT: 199 LBS | SYSTOLIC BLOOD PRESSURE: 136 MMHG | OXYGEN SATURATION: 99 % | DIASTOLIC BLOOD PRESSURE: 73 MMHG | BODY MASS INDEX: 27.86 KG/M2 | HEIGHT: 71 IN

## 2022-04-15 VITALS
SYSTOLIC BLOOD PRESSURE: 107 MMHG | RESPIRATION RATE: 20 BRPM | DIASTOLIC BLOOD PRESSURE: 56 MMHG | OXYGEN SATURATION: 95 %

## 2022-04-15 PROCEDURE — 6370000000 HC RX 637 (ALT 250 FOR IP): Performed by: INTERNAL MEDICINE

## 2022-04-15 PROCEDURE — 6360000002 HC RX W HCPCS: Performed by: NURSE ANESTHETIST, CERTIFIED REGISTERED

## 2022-04-15 PROCEDURE — 2709999900 HC NON-CHARGEABLE SUPPLY: Performed by: INTERNAL MEDICINE

## 2022-04-15 PROCEDURE — 7100000010 HC PHASE II RECOVERY - FIRST 15 MIN: Performed by: INTERNAL MEDICINE

## 2022-04-15 PROCEDURE — 2500000003 HC RX 250 WO HCPCS: Performed by: INTERNAL MEDICINE

## 2022-04-15 PROCEDURE — 3700000001 HC ADD 15 MINUTES (ANESTHESIA): Performed by: INTERNAL MEDICINE

## 2022-04-15 PROCEDURE — 2580000003 HC RX 258: Performed by: NURSE ANESTHETIST, CERTIFIED REGISTERED

## 2022-04-15 PROCEDURE — 3700000000 HC ANESTHESIA ATTENDED CARE: Performed by: INTERNAL MEDICINE

## 2022-04-15 PROCEDURE — 2500000003 HC RX 250 WO HCPCS: Performed by: NURSE ANESTHETIST, CERTIFIED REGISTERED

## 2022-04-15 PROCEDURE — 88305 TISSUE EXAM BY PATHOLOGIST: CPT

## 2022-04-15 PROCEDURE — 2580000003 HC RX 258: Performed by: ANESTHESIOLOGY

## 2022-04-15 PROCEDURE — 3609010600 HC COLONOSCOPY POLYPECTOMY SNARE/COLD BIOPSY: Performed by: INTERNAL MEDICINE

## 2022-04-15 PROCEDURE — 7100000011 HC PHASE II RECOVERY - ADDTL 15 MIN: Performed by: INTERNAL MEDICINE

## 2022-04-15 RX ORDER — SODIUM CHLORIDE 9 MG/ML
INJECTION, SOLUTION INTRAVENOUS CONTINUOUS
Status: DISCONTINUED | OUTPATIENT
Start: 2022-04-15 | End: 2022-04-15 | Stop reason: HOSPADM

## 2022-04-15 RX ORDER — LIDOCAINE HYDROCHLORIDE 20 MG/ML
INJECTION, SOLUTION EPIDURAL; INFILTRATION; INTRACAUDAL; PERINEURAL PRN
Status: DISCONTINUED | OUTPATIENT
Start: 2022-04-15 | End: 2022-04-15 | Stop reason: SDUPTHER

## 2022-04-15 RX ORDER — SODIUM CHLORIDE 9 MG/ML
INJECTION, SOLUTION INTRAVENOUS CONTINUOUS PRN
Status: DISCONTINUED | OUTPATIENT
Start: 2022-04-15 | End: 2022-04-15 | Stop reason: SDUPTHER

## 2022-04-15 RX ORDER — PROPOFOL 10 MG/ML
INJECTION, EMULSION INTRAVENOUS CONTINUOUS PRN
Status: DISCONTINUED | OUTPATIENT
Start: 2022-04-15 | End: 2022-04-15 | Stop reason: SDUPTHER

## 2022-04-15 RX ORDER — PROPOFOL 10 MG/ML
INJECTION, EMULSION INTRAVENOUS PRN
Status: DISCONTINUED | OUTPATIENT
Start: 2022-04-15 | End: 2022-04-15 | Stop reason: SDUPTHER

## 2022-04-15 RX ADMIN — PROPOFOL 60 MG: 10 INJECTION, EMULSION INTRAVENOUS at 09:07

## 2022-04-15 RX ADMIN — SODIUM CHLORIDE: 9 INJECTION, SOLUTION INTRAVENOUS at 08:51

## 2022-04-15 RX ADMIN — PROPOFOL 150 MCG/KG/MIN: 10 INJECTION, EMULSION INTRAVENOUS at 09:05

## 2022-04-15 RX ADMIN — LIDOCAINE HYDROCHLORIDE 100 MG: 20 INJECTION, SOLUTION EPIDURAL; INFILTRATION; INTRACAUDAL; PERINEURAL at 09:07

## 2022-04-15 RX ADMIN — SODIUM CHLORIDE: 9 INJECTION, SOLUTION INTRAVENOUS at 08:10

## 2022-04-15 ASSESSMENT — PULMONARY FUNCTION TESTS
PIF_VALUE: 1

## 2022-04-15 ASSESSMENT — PAIN SCALES - GENERAL
PAINLEVEL_OUTOF10: 0

## 2022-04-15 ASSESSMENT — PAIN - FUNCTIONAL ASSESSMENT: PAIN_FUNCTIONAL_ASSESSMENT: 0-10

## 2022-04-15 NOTE — BRIEF OP NOTE
Brief Postoperative Note - Full Note in Chart Review/Procedures tab       Patient: Jaison Suarez  YOB: 1945  MRN: [id number]    Date of Procedure: 4/15/2022    Pre-Op Diagnosis: Z86.010  HISTORY OF COLON POLYPS    Post-Op Diagnosis: Same       Procedure(s):  COLONOSCOPY POLYPECTOMY SNARE/COLD BIOPSY    Surgeon(s):  Blossom Blizzard, MD    Assistant:  * No surgical staff found *    Anesthesia: Monitor Anesthesia Care    Estimated Blood Loss (mL): Minimal    Complications: None    Specimens:   ID Type Source Tests Collected by Time Destination   A : Ascending colon polyps x 2 Tissue Tissue SURGICAL PATHOLOGY Blossom Blizzard, MD 4/15/2022 2835        Implants:  * No implants in log *      Drains: * No LDAs found *    Findings:  1) Two 4 mm and 5 mm polyps in the ascending colon removed with snare. 2) Pancolonic diverticulosis    Rec:  Resume diet  Continue present treatment. Await pathology results.    F/U WITH ME AS NEEDED  Colonoscopy in 5 years  Followup with referring physician as previously instucted    Electronically signed by Blossom Blizzard, MD on 4/15/2022 at 9:28 AM

## 2022-04-15 NOTE — ANESTHESIA POSTPROCEDURE EVALUATION
Department of Anesthesiology  Postprocedure Note    Patient: Hair Cheng  MRN: [id number]  YOB: 1945  Date of evaluation: 4/15/2022  Time:  11:13 AM     Procedure Summary     Date: 04/15/22 Room / Location: [address] [place] [address]    Anesthesia Start: 8483 Anesthesia Stop: 9075    Procedure: COLONOSCOPY POLYPECTOMY SNARE/COLD BIOPSY (N/A ) Diagnosis: (Z86.010  HISTORY OF COLON POLYPS)    Surgeons: Kameron Riley MD Responsible Provider: Radha Hernández MD    Anesthesia Type: MAC ASA Status: 2          Anesthesia Type: MAC    Riley Phase I: Riley Score: 10    Riley Phase II: Riley Score: 10    Last vitals: Reviewed and per EMR flowsheets.        Anesthesia Post Evaluation    Patient location during evaluation: PACU  Patient participation: complete - patient participated  Level of consciousness: awake and alert  Airway patency: patent  Nausea & Vomiting: no nausea and no vomiting  Complications: no  Cardiovascular status: blood pressure returned to baseline  Respiratory status: acceptable  Hydration status: euvolemic  Multimodal analgesia pain management approach

## 2022-04-15 NOTE — ANESTHESIA PRE PROCEDURE
Department of Anesthesiology  Preprocedure Note       Name:  William Pope   Age:  77 y.o.  :  1945                                          MRN:  [id number]         Date:  4/15/2022      Surgeon: Ryanne Lindsey):  Lucinda Soriano MD    Procedure: Procedure(s):  COLONOSCOPY DIAGNOSTIC    Medications prior to admission:   Prior to Admission medications    Medication Sig Start Date End Date Taking? Authorizing Provider   erythromycin [facility]) 5 MG/GM ophthalmic ointment Apply twice daily to the affected eye  Patient not taking: Reported on 4/15/2022 4/6/22   Jc King MD   valsartan (DIOVAN) 40 MG tablet TAKE ONE TABLET BY MOUTH DAILY 22   Jc King MD   doxazosin (CARDURA) 2 MG tablet TAKE 1 TABLET BY MOUTH EVERY DAY 21   Jc King MD   triamterene-hydroCHLOROthiazide (MAXZIDE-25) 37.5-25 MG per tablet TAKE 1 TABLET BY MOUTH EVERY DAY 21   Jc King MD   clobetasol (TEMOVATE) 0.05 % cream Apply topically 1 times daily. Patient taking differently: Apply topically 1 times daily prn 21   Jc King MD   b complex vitamins capsule Take 1 capsule by mouth daily    Historical Provider, MD   Red Yeast Rice Extract 600 MG CAPS Take 2 capsules by mouth daily. Historical Provider, MD   aspirin 81 MG EC tablet Take 81 mg by mouth daily. Historical Provider, MD       Current medications:    Current Facility-Administered Medications   Medication Dose Route Frequency Provider Last Rate Last Admin    0.9 % sodium chloride infusion   IntraVENous Continuous Mingo Bernard MD         Facility-Administered Medications Ordered in Other Encounters   Medication Dose Route Frequency Provider Last Rate Last Admin    0.9 % sodium chloride infusion   IntraVENous Continuous PRN FELIX Carrera - CRNA   New Bag at 04/15/22 4035       Allergies:     Allergies   Allergen Reactions    Shellfish-Derived Products Swelling     CRAB-soft shell       Problem List:    Patient Active Problem List   Diagnosis Code    TIA (transient ischemic attack) G45.9    Hypertension I10    Benign non-nodular prostatic hyperplasia with lower urinary tract symptoms N40.1    Hypercholesterolemia E78.00    Primary osteoarthritis involving multiple joints M89.49    Sensorineural hearing loss (SNHL) of both ears H90.3       Past Medical History:        Diagnosis Date    Enlarged prostate     Hypercholesterolemia 2014    Hypertension     Hypertrophy of prostate without urinary obstruction and other lower urinary tract symptoms (LUTS) 3/5/2013    Irregular heart beat     Tobacco abuse 2011    Unspecified transient cerebral ischemia        Past Surgical History:  History reviewed. No pertinent surgical history. Social History:    Social History     Tobacco Use    Smoking status: Former Smoker     Packs/day: 0.50     Years: 50.00     Pack years: 25.00     Start date:      Quit date: 2015     Years since quittin.6    Smokeless tobacco: Never Used   Substance Use Topics    Alcohol use: Yes     Alcohol/week: 21.0 standard drinks     Types: 21 Cans of beer per week                                Counseling given: Not Answered      Vital Signs (Current):   Vitals:    22 1519 04/15/22 0831   BP:  127/67   Pulse:  65   Resp:  16   Temp:  97.1 °F (36.2 °C)   TempSrc:  Temporal   SpO2:  100%   Weight: 202 lb (91.6 kg) 199 lb (90.3 kg)   Height: 5' 10.5\" (1.791 m) 5' 10.5\" (1.791 m)                                              BP Readings from Last 3 Encounters:   04/15/22 127/67   21 112/62   21 120/66       NPO Status:                                                                                 BMI:   Wt Readings from Last 3 Encounters:   04/15/22 199 lb (90.3 kg)   21 206 lb (93.4 kg)   21 199 lb (90.3 kg)     Body mass index is 28.15 kg/m².     CBC:   Lab Results   Component Value Date    WBC 5.3 2020    RBC 4.01 2020 HGB 13.4 12/01/2020    HCT 38.8 12/01/2020    MCV 96.9 12/01/2020    RDW 12.9 12/01/2020     12/01/2020       CMP:   Lab Results   Component Value Date     11/09/2021    K 4.1 11/09/2021    CL 96 11/09/2021    CO2 24 11/09/2021    BUN 7 11/09/2021    CREATININE 0.6 11/09/2021    GFRAA >60 11/09/2021    GFRAA >60 03/16/2013    AGRATIO 1.7 05/26/2021    LABGLOM >60 11/09/2021    GLUCOSE 91 11/09/2021    PROT 6.7 05/26/2021    PROT 6.9 03/16/2013    CALCIUM 8.7 11/09/2021    BILITOT 0.9 05/26/2021    ALKPHOS 49 05/26/2021    AST 15 05/26/2021    ALT 7 05/26/2021       POC Tests: No results for input(s): POCGLU, POCNA, POCK, POCCL, POCBUN, POCHEMO, POCHCT in the last 72 hours. Coags:   Lab Results   Component Value Date    PROTIME 11.4 07/18/2011    INR 1.04 07/18/2011       HCG (If Applicable): No results found for: PREGTESTUR, PREGSERUM, HCG, HCGQUANT     ABGs: No results found for: PHART, PO2ART, DCP6OYB, DNK6IPH, BEART, Q1IMREZG     Type & Screen (If Applicable):  No results found for: LABABO, LABRH    Drug/Infectious Status (If Applicable):  No results found for: HIV, HEPCAB    COVID-19 Screening (If Applicable): No results found for: COVID19        Anesthesia Evaluation   no history of anesthetic complications:   Airway: Mallampati: II  TM distance: <3 FB   Neck ROM: full  Mouth opening: > = 3 FB Dental: normal exam         Pulmonary:                              Cardiovascular:    (+) hypertension:,     (-) dysrhythmias                Neuro/Psych:               GI/Hepatic/Renal:             Endo/Other:                     Abdominal:             Vascular: Other Findings:             Anesthesia Plan      MAC     ASA 2       Induction: intravenous. Anesthetic plan and risks discussed with patient. Plan discussed with CRNA.                   Justyn Barth MD   4/15/2022

## 2022-04-15 NOTE — H&P
Gastroenterology Note             Pre-operative History and Physical    Patient: Renea Garcia  :   CSN:     History Obtained From:  patient and/or guardian. HISTORY OF PRESENT ILLNESS:    The patient is a 77 y.o. male with history of large sessile polyps in transverse colon and ascending colon last colonoscopy 2018 here for surveillance colonoscopy. Past Medical History:    Past Medical History:   Diagnosis Date    Colon polyps     Enlarged prostate     Hypercholesterolemia 2014    Hypertension     Hypertrophy of prostate without urinary obstruction and other lower urinary tract symptoms (LUTS) 3/5/2013    Irregular heart beat     Tobacco abuse 2011    Unspecified transient cerebral ischemia      Past Surgical History:    Past Surgical History:   Procedure Laterality Date    MOUTH SURGERY       Medications Prior to Admission:   No current facility-administered medications on file prior to encounter. Current Outpatient Medications on File Prior to Encounter   Medication Sig Dispense Refill    doxazosin (CARDURA) 2 MG tablet TAKE 1 TABLET BY MOUTH EVERY DAY 90 tablet 3    triamterene-hydroCHLOROthiazide (MAXZIDE-25) 37.5-25 MG per tablet TAKE 1 TABLET BY MOUTH EVERY DAY 90 tablet 3    clobetasol (TEMOVATE) 0.05 % cream Apply topically 1 times daily. (Patient taking differently: Apply topically 1 times daily prn) 60 g 5    b complex vitamins capsule Take 1 capsule by mouth daily      Red Yeast Rice Extract 600 MG CAPS Take 2 capsules by mouth daily.  aspirin 81 MG EC tablet Take 81 mg by mouth daily.             Allergies:  Shellfish-derived products      Social History:   Social History     Tobacco Use    Smoking status: Former Smoker     Packs/day: 0.50     Years: 50.00     Pack years: 25.00     Start date:      Quit date: 2015     Years since quittin.6    Smokeless tobacco: Never Used   Substance Use Topics    Alcohol use: Not Currently     Family History:   Family History   Problem Relation Age of Onset    High Blood Pressure Mother     Heart Disease Mother     Alzheimer's Disease Father     Prostate Cancer Father     Heart Disease Brother        PHYSICAL EXAM:      /67   Pulse 65   Temp 97.1 °F (36.2 °C) (Temporal)   Resp 16   Ht 5' 10.5\" (1.791 m)   Wt 199 lb (90.3 kg)   SpO2 100%   BMI 28.15 kg/m²  I        Heart:   RRR, normal s1s2    Lungs:  CTA bilat,  Normal effort    Abdomen:   NT, ND      ASA Grade:  ASA 2 - Patient with mild systemic disease with no functional limitations    Mallampati Class:  Class I: Soft palate, uvula, fauces, pillars visible  __________  Class II: Soft palate, uvula, fauces visible  ____X_____   Class III: Soft palate, base of uvula visible  __________  Class IV: Hard palate only visible   __________        ASSESSMENT AND PLAN:    1. Patient is a 77 y.o. male here for colonoscopy with MAC.   2.  Procedure options, risks and benefits reviewed with patient. Patient expresses understanding.      Lisandra Lance, [address] [place] [address]  4/15/2022

## 2022-05-09 ENCOUNTER — HOSPITAL ENCOUNTER (OUTPATIENT)
Age: 77
Discharge: HOME | End: 2022-05-09
Payer: MEDICARE

## 2022-05-09 DIAGNOSIS — Z12.5 SCREENING PSA (PROSTATE SPECIFIC ANTIGEN): ICD-10-CM

## 2022-05-09 DIAGNOSIS — I10 PRIMARY HYPERTENSION: ICD-10-CM

## 2022-05-09 LAB
A/G RATIO: 2.3 (ref 1.1–2.2)
ALBUMIN SERPL-MCNC: 4.5 G/DL (ref 3.4–5)
ALP BLD-CCNC: 53 U/L (ref 40–129)
ALT SERPL-CCNC: 9 U/L (ref 10–40)
ANION GAP SERPL CALCULATED.3IONS-SCNC: 12 MMOL/L (ref 3–16)
AST SERPL-CCNC: 14 U/L (ref 15–37)
BILIRUB SERPL-MCNC: 0.6 MG/DL (ref 0–1)
BUN BLDV-MCNC: 8 MG/DL (ref 7–20)
CALCIUM SERPL-MCNC: 9 MG/DL (ref 8.3–10.6)
CHLORIDE BLD-SCNC: 97 MMOL/L (ref 99–110)
CHOLESTEROL, TOTAL: 167 MG/DL (ref 0–199)
CO2: 24 MMOL/L (ref 21–32)
CREAT SERPL-MCNC: 0.7 MG/DL (ref 0.8–1.3)
GFR AFRICAN AMERICAN: >60
GFR NON-AFRICAN AMERICAN: >60
GLUCOSE BLD-MCNC: 95 MG/DL (ref 70–99)
HDLC SERPL-MCNC: 66 MG/DL (ref 40–60)
LDL CHOLESTEROL CALCULATED: 92 MG/DL
POTASSIUM SERPL-SCNC: 4 MMOL/L (ref 3.5–5.1)
PROSTATE SPECIFIC ANTIGEN: 0.68 NG/ML (ref 0–4)
SODIUM BLD-SCNC: 133 MMOL/L (ref 136–145)
TOTAL PROTEIN: 6.5 G/DL (ref 6.4–8.2)
TRIGL SERPL-MCNC: 45 MG/DL (ref 0–150)
VLDLC SERPL CALC-MCNC: 9 MG/DL

## 2022-05-09 PROCEDURE — 80061 LIPID PANEL: CPT

## 2022-05-09 PROCEDURE — 36415 COLL VENOUS BLD VENIPUNCTURE: CPT

## 2022-05-09 PROCEDURE — 84153 ASSAY OF PSA TOTAL: CPT

## 2022-05-09 PROCEDURE — 80053 COMPREHEN METABOLIC PANEL: CPT

## 2022-05-16 ENCOUNTER — OFFICE VISIT (OUTPATIENT)
Dept: FAMILY MEDICINE CLINIC | Age: 77
End: 2022-05-16
Payer: MEDICARE

## 2022-05-16 VITALS
SYSTOLIC BLOOD PRESSURE: 118 MMHG | RESPIRATION RATE: 12 BRPM | BODY MASS INDEX: 29.19 KG/M2 | WEIGHT: 206.38 LBS | TEMPERATURE: 98.5 F | HEART RATE: 79 BPM | OXYGEN SATURATION: 98 % | DIASTOLIC BLOOD PRESSURE: 72 MMHG

## 2022-05-16 DIAGNOSIS — R91.8 MULTIPLE SUBSOLID LUNG NODULES LESS THAN 6 MM IN DIAMETER: ICD-10-CM

## 2022-05-16 DIAGNOSIS — I10 ESSENTIAL HYPERTENSION: Primary | ICD-10-CM

## 2022-05-16 DIAGNOSIS — H90.3 SENSORINEURAL HEARING LOSS (SNHL) OF BOTH EARS: ICD-10-CM

## 2022-05-16 DIAGNOSIS — N40.1 BENIGN NON-NODULAR PROSTATIC HYPERPLASIA WITH LOWER URINARY TRACT SYMPTOMS: ICD-10-CM

## 2022-05-16 PROCEDURE — 3017F COLORECTAL CA SCREEN DOC REV: CPT | Performed by: FAMILY MEDICINE

## 2022-05-16 PROCEDURE — 4040F PNEUMOC VAC/ADMIN/RCVD: CPT | Performed by: FAMILY MEDICINE

## 2022-05-16 PROCEDURE — G8417 CALC BMI ABV UP PARAM F/U: HCPCS | Performed by: FAMILY MEDICINE

## 2022-05-16 PROCEDURE — 99214 OFFICE O/P EST MOD 30 MIN: CPT | Performed by: FAMILY MEDICINE

## 2022-05-16 PROCEDURE — G8427 DOCREV CUR MEDS BY ELIG CLIN: HCPCS | Performed by: FAMILY MEDICINE

## 2022-05-16 PROCEDURE — 1036F TOBACCO NON-USER: CPT | Performed by: FAMILY MEDICINE

## 2022-05-16 PROCEDURE — 1123F ACP DISCUSS/DSCN MKR DOCD: CPT | Performed by: FAMILY MEDICINE

## 2022-05-16 RX ORDER — TRIAMTERENE AND HYDROCHLOROTHIAZIDE 37.5; 25 MG/1; MG/1
TABLET ORAL
Qty: 90 TABLET | Refills: 3 | Status: SHIPPED | OUTPATIENT
Start: 2022-05-16 | End: 2022-07-15 | Stop reason: ALTCHOICE

## 2022-05-16 RX ORDER — DOXAZOSIN 2 MG/1
TABLET ORAL
Qty: 90 TABLET | Refills: 3 | Status: SHIPPED | OUTPATIENT
Start: 2022-05-16 | End: 2022-07-15 | Stop reason: ALTCHOICE

## 2022-05-16 NOTE — PROGRESS NOTES
Subjective:      Patient ID: Rayshawn Barth is a 77 y.o. male. CC: Patient presents for re-evaluation of chronic health problems including hypertension, history of lung nodules, BPH and hearing loss. Ewa REY Pt is here for a follow up, med refill, test results. He still feels his energy level is lower than he would like it to be. He has started an exercise program just recently. He did go to [place] and enjoyed his time there. He continues wear hearing aids and he feels this is working well for him. Is been over a year since he had a CT scan and radiologist recommended a repeat CT scan for the lung nodules. Patient is very compliant with medications with no adverse reactions. Review of Systems  Patient Active Problem List   Diagnosis    TIA (transient ischemic attack)    Hypertension    Benign non-nodular prostatic hyperplasia with lower urinary tract symptoms    Hypercholesterolemia    Primary osteoarthritis involving multiple joints    Sensorineural hearing loss (SNHL) of both ears       Outpatient Medications Marked as Taking for the 5/16/22 encounter (Office Visit) with Ruben Evangelista MD   Medication Sig Dispense Refill    valsartan (DIOVAN) 40 MG tablet TAKE ONE TABLET BY MOUTH DAILY 90 tablet 1    doxazosin (CARDURA) 2 MG tablet TAKE 1 TABLET BY MOUTH EVERY DAY 90 tablet 3    triamterene-hydroCHLOROthiazide (MAXZIDE-25) 37.5-25 MG per tablet TAKE 1 TABLET BY MOUTH EVERY DAY 90 tablet 3    clobetasol (TEMOVATE) 0.05 % cream Apply topically 1 times daily. (Patient taking differently: Apply topically 1 times daily prn) 60 g 5    b complex vitamins capsule Take 1 capsule by mouth daily      Red Yeast Rice Extract 600 MG CAPS Take 2 capsules by mouth daily.  aspirin 81 MG EC tablet Take 81 mg by mouth daily.            Allergies   Allergen Reactions    Shellfish-Derived Products Swelling     CRAB-soft shell       Social History     Tobacco Use    Smoking status: Former Smoker Packs/day: 0.50     Years: 50.00     Pack years: 25.00     Start date: 66     Quit date: 2015     Years since quittin.7    Smokeless tobacco: Never Used   Substance Use Topics    Alcohol use: Not Currently       /72 (Site: Right Upper Arm, Position: Sitting, Cuff Size: Large Adult)   Pulse 79   Temp 98.5 °F (36.9 °C) (Infrared)   Resp 12   Wt 206 lb 6 oz (93.6 kg)   SpO2 98%   BMI 29.19 kg/m²     Objective:   Physical Exam  Constitutional:       General: He is not in acute distress. Appearance: He is well-developed. Neck:      Vascular: No carotid bruit. Cardiovascular:      Rate and Rhythm: Normal rate and regular rhythm. Pulses:           Dorsalis pedis pulses are 2+ on the right side and 2+ on the left side. Posterior tibial pulses are 2+ on the right side and 2+ on the left side. Heart sounds: Normal heart sounds. No murmur heard. No friction rub. No gallop. Pulmonary:      Effort: Pulmonary effort is normal.      Breath sounds: Normal breath sounds. Musculoskeletal:         General: No tenderness. Normal range of motion. Right lower leg: No edema. Left lower leg: No edema. Neurological:      Mental Status: He is alert and oriented to person, place, and time. Mental status is at baseline. Sensory: Sensation is intact. Motor: Motor function is intact. Psychiatric:         Mood and Affect: Mood normal.         Behavior: Behavior normal. Behavior is cooperative. Thought Content: Thought content normal.         Judgment: Judgment normal.         Assessment:      Rain Ferrari was seen today for follow-up, hypertension and hyperlipidemia. Diagnoses and all orders for this visit:    Essential hypertension  -     triamterene-hydroCHLOROthiazide (MAXZIDE-25) 37.5-25 MG per tablet; TAKE 1 TABLET BY MOUTH EVERY DAY    Multiple subsolid lung nodules less than 6 mm in diameter  -     CT CHEST WO CONTRAST;  Future    Benign non-nodular prostatic hyperplasia with lower urinary tract symptoms    Sensorineural hearing loss (SNHL) of both ears    Other orders  -     doxazosin (CARDURA) 2 MG tablet; TAKE 1 TABLET BY MOUTH EVERY DAY            Plan:      Reviewed labs and test results with patient. CT scan of the chest was ordered in regards to the pulmonary nodules. Would recommend obviously an exercise program for the decreased energy and fatigue    Patient received counseling on the following healthy behaviors: nutrition and exercise     Patient given educational materials     Health maintenance updated    Discussed use, benefit, and side effects of prescribed medications. Barriers to medication compliance addressed. All patient questions answered. Pt voiced understanding. Patient needs RTC in 6 months for annual Medicare visit. Medical decision making of moderate complexity. Please note that this chart was generated using Dragon dictation software. Although every effort was made to ensure the accuracy of this automated transcription, some errors in transcription may have occurred.

## 2022-05-26 ENCOUNTER — HOSPITAL ENCOUNTER (OUTPATIENT)
Dept: CT IMAGING | Age: 77
Discharge: HOME | End: 2022-05-26
Payer: MEDICARE

## 2022-05-26 DIAGNOSIS — R91.8 MULTIPLE SUBSOLID LUNG NODULES LESS THAN 6 MM IN DIAMETER: ICD-10-CM

## 2022-05-26 PROCEDURE — 71250 CT THORAX DX C-: CPT

## 2022-05-27 ENCOUNTER — TELEPHONE (OUTPATIENT)
Dept: FAMILY MEDICINE CLINIC | Age: 77
End: 2022-05-27

## 2022-05-27 DIAGNOSIS — I25.84 CORONARY ARTERY CALCIFICATION: Primary | ICD-10-CM

## 2022-05-27 DIAGNOSIS — I25.10 CORONARY ARTERY CALCIFICATION: Primary | ICD-10-CM

## 2022-05-27 NOTE — TELEPHONE ENCOUNTER
----- Message from Sary Carbajal MD sent at 5/27/2022  9:35 AM EDT -----  Lung nodules stable, recheck 1 year  Shows severe calcification of his coronary arteries.  Recommend GXT myoview stress test to further eval

## 2022-06-01 ENCOUNTER — HOSPITAL ENCOUNTER (OUTPATIENT)
Dept: NON INVASIVE DIAGNOSTICS | Age: 77
Discharge: HOME | End: 2022-06-01
Payer: MEDICARE

## 2022-06-01 DIAGNOSIS — I25.84 CORONARY ARTERY CALCIFICATION: ICD-10-CM

## 2022-06-01 DIAGNOSIS — I25.10 CORONARY ARTERY CALCIFICATION: ICD-10-CM

## 2022-06-01 LAB
LV EF: 64 %
LVEF MODALITY: NORMAL

## 2022-06-01 PROCEDURE — 3430000000 HC RX DIAGNOSTIC RADIOPHARMACEUTICAL: Performed by: FAMILY MEDICINE

## 2022-06-01 PROCEDURE — 78452 HT MUSCLE IMAGE SPECT MULT: CPT

## 2022-06-01 PROCEDURE — A9502 TC99M TETROFOSMIN: HCPCS | Performed by: FAMILY MEDICINE

## 2022-06-01 PROCEDURE — 93017 CV STRESS TEST TRACING ONLY: CPT | Performed by: INTERNAL MEDICINE

## 2022-06-01 RX ADMIN — TETROFOSMIN 10 MILLICURIE: 1.38 INJECTION, POWDER, LYOPHILIZED, FOR SOLUTION INTRAVENOUS at 07:57

## 2022-06-01 RX ADMIN — TETROFOSMIN 30 MILLICURIE: 1.38 INJECTION, POWDER, LYOPHILIZED, FOR SOLUTION INTRAVENOUS at 09:06

## 2022-07-15 ENCOUNTER — OFFICE VISIT (OUTPATIENT)
Dept: FAMILY MEDICINE CLINIC | Age: 77
End: 2022-07-15
Payer: MEDICARE

## 2022-07-15 ENCOUNTER — NURSE TRIAGE (OUTPATIENT)
Dept: OTHER | Facility: CLINIC | Age: 77
End: 2022-07-15

## 2022-07-15 VITALS
TEMPERATURE: 98.2 F | HEART RATE: 68 BPM | WEIGHT: 205.25 LBS | SYSTOLIC BLOOD PRESSURE: 132 MMHG | BODY MASS INDEX: 29.03 KG/M2 | RESPIRATION RATE: 12 BRPM | DIASTOLIC BLOOD PRESSURE: 72 MMHG | OXYGEN SATURATION: 98 %

## 2022-07-15 DIAGNOSIS — I10 PRIMARY HYPERTENSION: ICD-10-CM

## 2022-07-15 DIAGNOSIS — R53.83 FATIGUE, UNSPECIFIED TYPE: ICD-10-CM

## 2022-07-15 DIAGNOSIS — I95.1 ORTHOSTATIC HYPOTENSION: Primary | ICD-10-CM

## 2022-07-15 PROCEDURE — G8428 CUR MEDS NOT DOCUMENT: HCPCS | Performed by: FAMILY MEDICINE

## 2022-07-15 PROCEDURE — 1123F ACP DISCUSS/DSCN MKR DOCD: CPT | Performed by: FAMILY MEDICINE

## 2022-07-15 PROCEDURE — G8417 CALC BMI ABV UP PARAM F/U: HCPCS | Performed by: FAMILY MEDICINE

## 2022-07-15 PROCEDURE — 3017F COLORECTAL CA SCREEN DOC REV: CPT | Performed by: FAMILY MEDICINE

## 2022-07-15 PROCEDURE — 1036F TOBACCO NON-USER: CPT | Performed by: FAMILY MEDICINE

## 2022-07-15 PROCEDURE — 99214 OFFICE O/P EST MOD 30 MIN: CPT | Performed by: FAMILY MEDICINE

## 2022-07-15 NOTE — TELEPHONE ENCOUNTER
Received call from Jose Luis Murphy at [facility] with [employer]. Subjective: Caller states \"I have general fatigue and sometimes when I stand up I get dizzy\"     Current Symptoms: dizziness and fatigue    Onset: 7 months ago; worsening    Pain Severity: 0/10; N/A; none    Temperature: denies     What has been tried: NA    Recommended disposition: See in Office Today    Care advice provided, patient verbalizes understanding; denies any other questions or concerns; instructed to call back for any new or worsening symptoms. Patient/Caller agrees with recommended disposition; writer provided warm transfer to [place] at [facility] for appointment scheduling     Attention Provider: Thank you for allowing me to participate in the care of your patient. The patient was connected to triage in response to information provided to the ECC/PSC. Please do not respond through this encounter as the response is not directed to a shared pool.       Reason for Disposition   MODERATE dizziness (e.g., interferes with normal activities) (Exception: dizziness caused by heat exposure, sudden standing, or poor fluid intake)    Protocols used: Dizziness-ADULT-[place]

## 2022-07-15 NOTE — PROGRESS NOTES
Subjective:      Patient ID: Jenny Connell is a 77 y.o. male. CC: Patient presents for acute medical problem-dizziness and fatigue. Medical assistant notes reviewed. Fatigue  This is a new problem. Episode onset: several months ago. The problem has been unchanged. Associated symptoms include fatigue. Associated symptoms comments: dizziness. Nothing aggravates the symptoms. He has tried nothing for the symptoms. The treatment provided no relief. Dizziness  This is a new problem. The problem has been unchanged. Associated symptoms include fatigue. Nothing aggravates the symptoms. He has tried nothing for the symptoms. Pt states geremias he has been on a new BP med since last month and thinks this maybe the cause . BP read 117/61 and standing 95/62. Patient started noticing this after he was outside doing a lot more yard work than normal.  Patient also complains he is just fatigued all the time. He sleeps well feels refreshed when he awakens in the morning. The symptoms have been going on for over 2 years. He had recent laboratory evaluation regards to chemistry but his not had a CBC or thyroid for over a year. Medications have been unchanged. Review of Systems   Constitutional:  Positive for fatigue. Neurological:  Positive for dizziness. Vitals:    07/15/22 1624   BP: 132/72   Pulse: 68   Resp: 12   Temp: 98.2 °F (36.8 °C)   SpO2: 98%     Blood pressure with sitting is 124/74 and upon standing is 110/68  Objective:   Physical Exam  Constitutional:       General: He is not in acute distress. HENT:      Right Ear: Tympanic membrane and ear canal normal.      Left Ear: Tympanic membrane and ear canal normal.   Neck:      Vascular: No carotid bruit. Cardiovascular:      Rate and Rhythm: Normal rate and regular rhythm. Heart sounds: No murmur heard. Musculoskeletal:      Cervical back: Neck supple. Lymphadenopathy:      Cervical: No cervical adenopathy.    Neurological:      Mental Status: He is alert and oriented to person, place, and time. Cranial Nerves: Cranial nerves are intact. Sensory: Sensation is intact. Motor: Motor function is intact. Coordination: Coordination is intact. Gait: Gait is intact. Psychiatric:         Behavior: Behavior is cooperative. Assessment:      Phil Myles was seen today for fatigue and dizziness. Diagnoses and all orders for this visit:    Orthostatic hypotension  -     CBC; Future    Fatigue, unspecified type  -     CBC; Future  -     TSH; Future  -     FREE T4; Future    Primary hypertension    Discontinue Dyazide and terazosin      Plan: For the orthostatic hypotension recommend at this point in time to discontinue the Dyazide medication. His blood pressure is not controlled and the valsartan medication was added almost 2 years ago. Patient going to continue monitor blood pressure weekly and report back his readings in the next month. For the fatigue we will go ahead and proceed with additional laboratory testing but since he is not having much urinary symptoms went ahead and stop the terazosin medication as well. Patient again can report back his symptoms 1 month    RTC at normal appointment time    Medical decision making of moderate complexity. Please note that this chart was generated using Dragon dictation software. Although every effort was made to ensure the accuracy of this automated transcription, some errors in transcription may have occurred.

## 2022-07-16 ASSESSMENT — PATIENT HEALTH QUESTIONNAIRE - PHQ9
1. LITTLE INTEREST OR PLEASURE IN DOING THINGS: 0
SUM OF ALL RESPONSES TO PHQ9 QUESTIONS 1 & 2: 0
SUM OF ALL RESPONSES TO PHQ QUESTIONS 1-9: 0
2. FEELING DOWN, DEPRESSED OR HOPELESS: 0
SUM OF ALL RESPONSES TO PHQ QUESTIONS 1-9: 0

## 2022-07-18 ENCOUNTER — HOSPITAL ENCOUNTER (OUTPATIENT)
Age: 77
Discharge: HOME | End: 2022-07-18
Payer: MEDICARE

## 2022-07-18 DIAGNOSIS — R53.83 FATIGUE, UNSPECIFIED TYPE: ICD-10-CM

## 2022-07-18 DIAGNOSIS — I95.1 ORTHOSTATIC HYPOTENSION: ICD-10-CM

## 2022-07-18 LAB
HCT VFR BLD CALC: 38.5 % (ref 40.5–52.5)
HEMOGLOBIN: 13.2 G/DL (ref 13.5–17.5)
MCH RBC QN AUTO: 33.2 PG (ref 26–34)
MCHC RBC AUTO-ENTMCNC: 34.4 G/DL (ref 31–36)
MCV RBC AUTO: 96.6 FL (ref 80–100)
PDW BLD-RTO: 13.3 % (ref 12.4–15.4)
PLATELET # BLD: 219 K/UL (ref 135–450)
PMV BLD AUTO: 7.6 FL (ref 5–10.5)
RBC # BLD: 3.98 M/UL (ref 4.2–5.9)
T4 FREE: 1.3 NG/DL (ref 0.9–1.8)
TSH SERPL DL<=0.05 MIU/L-ACNC: 0.92 UIU/ML (ref 0.27–4.2)
WBC # BLD: 5.1 K/UL (ref 4–11)

## 2022-07-18 PROCEDURE — 84443 ASSAY THYROID STIM HORMONE: CPT

## 2022-07-18 PROCEDURE — 36415 COLL VENOUS BLD VENIPUNCTURE: CPT

## 2022-07-18 PROCEDURE — 85027 COMPLETE CBC AUTOMATED: CPT

## 2022-07-18 PROCEDURE — 84439 ASSAY OF FREE THYROXINE: CPT

## 2022-08-08 RX ORDER — HYDROCHLOROTHIAZIDE 12.5 MG/1
12.5 CAPSULE, GELATIN COATED ORAL DAILY
Qty: 30 CAPSULE | Refills: 3 | Status: SHIPPED | OUTPATIENT
Start: 2022-08-08 | End: 2022-10-26

## 2022-10-17 RX ORDER — VALSARTAN 40 MG/1
TABLET ORAL
Qty: 90 TABLET | Refills: 1 | Status: SHIPPED | OUTPATIENT
Start: 2022-10-17

## 2022-10-24 ENCOUNTER — TELEPHONE (OUTPATIENT)
Dept: FAMILY MEDICINE CLINIC | Age: 77
End: 2022-10-24

## 2022-10-24 ENCOUNTER — NURSE TRIAGE (OUTPATIENT)
Dept: OTHER | Facility: CLINIC | Age: 77
End: 2022-10-24

## 2022-10-24 NOTE — TELEPHONE ENCOUNTER
----- Message from Abhijeet Emerson sent at 10/24/2022 10:26 AM EDT -----  Subject: Appointment Request    Reason for Call: Established Patient Appointment needed: Semi-Urgent   Return from RN Triage    QUESTIONS    Reason for appointment request? No appointments available during search     Additional Information for Provider? Patient needs to be seen within 3   days for right calf pain on and off. No appt available. Patient wants to   see his provider.   ---------------------------------------------------------------------------  --------------  Suhail FOSTER  [phone]; OK to leave message on voicemail  ---------------------------------------------------------------------------  --------------  SCRIPT ANSWERS  COVID Screen: Lakhwinder Morton

## 2022-10-24 NOTE — TELEPHONE ENCOUNTER
Location of patient: [address] call from Jordi Laurent at [facility] with [employer]. Subjective: Caller states \"Weakness and no energy, interested in testing for dementia, pain back of leg\"     Current Symptoms: Leg pain in right calf for 3 months on and off -when walking for about 200 yards, no swelling or discoloration. Weakness and low engergy for a while-been seen by PCP-told to keep an eye on. Left ear -t on top, growth started to scab over-probably sun damage-want PCP to look at. Onset: 3 months ago; gradual, intermittent, unchanged    Associated Symptoms: NA    Pain Severity: Denies    Temperature: Denies    What has been tried: If hurts then sit down-improves    LMP: NA Pregnant: NA    Recommended disposition: See PCP within 3 Days    Care advice provided, patient verbalizes understanding; denies any other questions or concerns; instructed to call back for any new or worsening symptoms. Patient/Caller agrees with recommended disposition; writer provided warm transfer to Thomas Ruvalcaba at [facility] for appointment scheduling    Attention Provider: Thank you for allowing me to participate in the care of your patient. The patient was connected to triage in response to information provided to the ECC/PSC. Please do not respond through this encounter as the response is not directed to a shared pool.         Reason for Disposition   Leg pain which occurs after walking a certain distance and disappears with rest, AND age > 50    Protocols used: Leg Pain-ADULT-[place]

## 2022-10-26 ENCOUNTER — OFFICE VISIT (OUTPATIENT)
Dept: FAMILY MEDICINE CLINIC | Age: 77
End: 2022-10-26
Payer: MEDICARE

## 2022-10-26 VITALS
OXYGEN SATURATION: 99 % | WEIGHT: 199.4 LBS | RESPIRATION RATE: 12 BRPM | TEMPERATURE: 98.1 F | DIASTOLIC BLOOD PRESSURE: 70 MMHG | BODY MASS INDEX: 28.21 KG/M2 | HEART RATE: 75 BPM | SYSTOLIC BLOOD PRESSURE: 130 MMHG

## 2022-10-26 DIAGNOSIS — R41.3 MEMORY DISORDER: ICD-10-CM

## 2022-10-26 DIAGNOSIS — I73.9 PAD (PERIPHERAL ARTERY DISEASE) (HCC): Primary | ICD-10-CM

## 2022-10-26 DIAGNOSIS — L82.1 SEBORRHEIC KERATOSIS: ICD-10-CM

## 2022-10-26 DIAGNOSIS — I73.9 CLAUDICATION OF RIGHT LOWER EXTREMITY (HCC): ICD-10-CM

## 2022-10-26 PROCEDURE — G8484 FLU IMMUNIZE NO ADMIN: HCPCS | Performed by: FAMILY MEDICINE

## 2022-10-26 PROCEDURE — 1036F TOBACCO NON-USER: CPT | Performed by: FAMILY MEDICINE

## 2022-10-26 PROCEDURE — 3074F SYST BP LT 130 MM HG: CPT | Performed by: FAMILY MEDICINE

## 2022-10-26 PROCEDURE — 3078F DIAST BP <80 MM HG: CPT | Performed by: FAMILY MEDICINE

## 2022-10-26 PROCEDURE — G8417 CALC BMI ABV UP PARAM F/U: HCPCS | Performed by: FAMILY MEDICINE

## 2022-10-26 PROCEDURE — G8428 CUR MEDS NOT DOCUMENT: HCPCS | Performed by: FAMILY MEDICINE

## 2022-10-26 PROCEDURE — 1123F ACP DISCUSS/DSCN MKR DOCD: CPT | Performed by: FAMILY MEDICINE

## 2022-10-26 PROCEDURE — 99214 OFFICE O/P EST MOD 30 MIN: CPT | Performed by: FAMILY MEDICINE

## 2022-10-26 RX ORDER — CLOPIDOGREL BISULFATE 75 MG/1
75 TABLET ORAL DAILY
Qty: 90 TABLET | Refills: 1 | Status: SHIPPED | OUTPATIENT
Start: 2022-10-26

## 2022-10-26 RX ORDER — HYDROCHLOROTHIAZIDE 12.5 MG/1
12.5 CAPSULE, GELATIN COATED ORAL DAILY
Qty: 90 CAPSULE | Refills: 3 | Status: SHIPPED | OUTPATIENT
Start: 2022-10-26

## 2022-10-26 NOTE — PROGRESS NOTES
Subjective:      Patient ID: Jose J Thompson is a 69 y.o. male. CC: Patient presents for acute medical problem-right calf pain, skin lesion and memory issues. Medical assistant notes reviewed. HPI patient resents for several issues. Patient is here for right calf pain. States it started 3 months ago. Patient tried to walk everyday. He has noticed that it has become bothersome more now. He can only walk 300 yards before it starts to hurt. He has noted that if he stands after walking with regards to symptoms do improve but every time he walks of 300 yards again he has problems. He is having difficulty cutting the grass because of the leg pain. He denies any symptoms in the left leg. Patient also has a small lump on left ear lobe but he states his wife is concerned about. He also wanted talk about memory issues. He does not feel he has any memory issues but his wife is concerned that he is having some memory problems. He does having issues with remembering day-to-day events or becoming lost with driving. He states occasionally does forget things but it is typically items that he supposed to take care of at a later point in the day.     Review of Systems    Patient Active Problem List   Diagnosis    TIA (transient ischemic attack)    Hypertension    Benign non-nodular prostatic hyperplasia with lower urinary tract symptoms    Hypercholesterolemia    Primary osteoarthritis involving multiple joints    Sensorineural hearing loss (SNHL) of both ears       No outpatient medications have been marked as taking for the 10/26/22 encounter (Office Visit) with Michael Willingham MD.       Allergies   Allergen Reactions    Shellfish-Derived Products Swelling     CRAB-soft shell       Social History     Tobacco Use    Smoking status: Former     Packs/day: 0.50     Years: 50.00     Pack years: 25.00     Types: Cigarettes     Start date: 66     Quit date: 2015     Years since quittin.1    Smokeless tobacco: Never   Substance Use Topics    Alcohol use: Not Currently       /70 (Site: Left Upper Arm, Position: Sitting, Cuff Size: Large Adult)   Pulse 75   Temp 98.1 °F (36.7 °C) (Infrared)   Resp 12   Wt 199 lb 6.4 oz (90.4 kg)   SpO2 99%   BMI 28.21 kg/m²      Objective:   Physical Exam  Constitutional:       General: He is not in acute distress. Appearance: He is well-developed. Neck:      Vascular: No carotid bruit. Cardiovascular:      Rate and Rhythm: Normal rate and regular rhythm. Pulses:           Dorsalis pedis pulses are 1+ on the right side and 2+ on the left side. Posterior tibial pulses are 1+ on the right side and 2+ on the left side. Heart sounds: Normal heart sounds. No murmur heard. No friction rub. No gallop. Pulmonary:      Effort: Pulmonary effort is normal.      Breath sounds: Normal breath sounds. Musculoskeletal:         General: No tenderness. Normal range of motion. Right lower leg: No edema. Left lower leg: No edema. Skin:     Findings: Lesion present. Comments: Top of the left ear with seborrheic keratosis   Neurological:      Mental Status: He is alert and oriented to person, place, and time. Sensory: Sensation is intact. Motor: Motor function is intact. Psychiatric:         Speech: Speech normal.         Behavior: Behavior is cooperative. Cognition and Memory: Cognition and memory normal.       Assessment:      Pedro Mcdonald was seen today for leg pain. Diagnoses and all orders for this visit:    PAD (peripheral artery disease) ([facility] [address]  -     Cancel: VL DUP LOWER EXTREMITY ARTERIES RIGHT; Future  -     VL MADI BILATERAL LIMITED 1-2 LEVELS; Future    Claudication of right lower extremity (HCC)  -     Cancel: VL DUP LOWER EXTREMITY ARTERIES RIGHT; Future  -     VL MADI BILATERAL LIMITED 1-2 LEVELS; Future    Seborrheic keratosis    Memory disorder    Other orders  -     hydroCHLOROthiazide (MICROZIDE) 12.5 MG capsule;  Take 1 capsule by mouth daily  -     clopidogrel (PLAVIX) 75 MG tablet; Take 1 tablet by mouth daily          Plan:      Recent laboratory profile reviewed with patient demonstrating no significant changes other than low sodium. His symptoms are very consistent with claudication of the right lower extremity. He is in agreement this point time start medical management and proceed with vascular studies. He does not want to take any cholesterol medications. For the seborrheic keratosis requires no treatment    Clinically I cannot find any memory difficulties and recommend the next time he comes that his wife comes with him to discuss what she is perceiving. Medical decision making of moderate complexity. Please note that this chart was generated using Dragon dictation software. Although every effort was made to ensure the accuracy of this automated transcription, some errors in transcription may have occurred.

## 2022-10-27 ENCOUNTER — TELEPHONE (OUTPATIENT)
Dept: FAMILY MEDICINE CLINIC | Age: 77
End: 2022-10-27

## 2022-10-27 NOTE — TELEPHONE ENCOUNTER
Pt returning call to office. Advised of /Linda's note. Patient stated understanding and will get the imaging done 11/7.

## 2022-10-27 NOTE — TELEPHONE ENCOUNTER
[address] [facility]. Per Dr. Estela Pappas patient's test doesn't need to be STAT but as soon as possible. Called to schedule and [facility], [facility]. is the only hospital that has an opening on 11/7/22, all others hospital next available appts are in December. Appointment was scheduled for 11/7/22 at 1 pm. Pt needs to arrive at 12:30, no smoking 1 hr prior to test, he will need to go to the main entrance and take elevator A to the first floor. Address is [address] [address].

## 2022-10-28 ENCOUNTER — PATIENT MESSAGE (OUTPATIENT)
Dept: FAMILY MEDICINE CLINIC | Age: 77
End: 2022-10-28

## 2022-10-28 NOTE — TELEPHONE ENCOUNTER
From: Alexia See  To: Dr. Corey Daniels: 10/28/2022 12:15 PM EDT  Subject: Medication     During my visit with you yesterday you refilled my hydrochlorothiazid 12.5mg, to 90 day refills. When picking up the refill today they gave me clopidogrel 75 mg tablets is this correct?

## 2022-12-07 ENCOUNTER — HOSPITAL ENCOUNTER (OUTPATIENT)
Dept: VASCULAR LAB | Age: 77
Discharge: HOME | End: 2022-12-07
Payer: MEDICARE

## 2022-12-07 DIAGNOSIS — I73.9 CLAUDICATION OF RIGHT LOWER EXTREMITY (HCC): ICD-10-CM

## 2022-12-07 DIAGNOSIS — I73.9 PAD (PERIPHERAL ARTERY DISEASE) (HCC): ICD-10-CM

## 2022-12-07 PROCEDURE — 93922 UPR/L XTREMITY ART 2 LEVELS: CPT

## 2022-12-12 ENCOUNTER — TELEPHONE (OUTPATIENT)
Dept: FAMILY MEDICINE CLINIC | Age: 77
End: 2022-12-12

## 2022-12-12 DIAGNOSIS — I73.9 CLAUDICATION OF RIGHT LOWER EXTREMITY (HCC): Primary | ICD-10-CM

## 2022-12-12 NOTE — TELEPHONE ENCOUNTER
----- Message from Mary Berry MD sent at 12/12/2022  8:01 AM EST -----  Vascular studies demonstrate decreased blood flow to the right leg but normal in the left leg.   Would recommend consultation with vascular surgery -- Dr. Cayden Cunha

## 2023-01-24 ENCOUNTER — OFFICE VISIT (OUTPATIENT)
Dept: VASCULAR SURGERY | Age: 78
End: 2023-01-24
Payer: MEDICARE

## 2023-01-24 VITALS
BODY MASS INDEX: 27.58 KG/M2 | HEIGHT: 71 IN | WEIGHT: 197 LBS | SYSTOLIC BLOOD PRESSURE: 177 MMHG | DIASTOLIC BLOOD PRESSURE: 100 MMHG

## 2023-01-24 DIAGNOSIS — I70.211 ATHEROSCLEROSIS OF NATIVE ARTERIES OF EXTREMITIES WITH INTERMITTENT CLAUDICATION, RIGHT LEG (HCC): Primary | ICD-10-CM

## 2023-01-24 PROCEDURE — 1123F ACP DISCUSS/DSCN MKR DOCD: CPT | Performed by: SURGERY

## 2023-01-24 PROCEDURE — 3077F SYST BP >= 140 MM HG: CPT | Performed by: SURGERY

## 2023-01-24 PROCEDURE — G8417 CALC BMI ABV UP PARAM F/U: HCPCS | Performed by: SURGERY

## 2023-01-24 PROCEDURE — 3080F DIAST BP >= 90 MM HG: CPT | Performed by: SURGERY

## 2023-01-24 PROCEDURE — G8484 FLU IMMUNIZE NO ADMIN: HCPCS | Performed by: SURGERY

## 2023-01-24 PROCEDURE — 1036F TOBACCO NON-USER: CPT | Performed by: SURGERY

## 2023-01-24 PROCEDURE — G8427 DOCREV CUR MEDS BY ELIG CLIN: HCPCS | Performed by: SURGERY

## 2023-01-24 PROCEDURE — 99203 OFFICE O/P NEW LOW 30 MIN: CPT | Performed by: SURGERY

## 2023-01-24 RX ORDER — CILOSTAZOL 100 MG/1
100 TABLET ORAL 2 TIMES DAILY
Qty: 60 TABLET | Refills: 3 | Status: SHIPPED | OUTPATIENT
Start: 2023-01-24

## 2023-01-24 NOTE — LETTER
[facility]) - Vascular and Endovascular Surgeons  [address] [address]  Phone: [phone]  Fax: [phone]           Patricia Mitchell MD      January 24, 2023     Patient: Alba Land   MR Number: [id number]   YOB: 1945   Date of Visit: 1/24/2023       Dear Dr. Claudine Barillas: Thank you for referring Flavio Mak to me for evaluation/treatment. Below are the relevant portions of my assessment and plan of care. If you have questions, please do not hesitate to call me. I look forward to following Lazaro Balbuena along with you.     Sincerely,        Patricia Mitchell MD    CC providers:  Fátima Rodríguez MD  [address] [place] [address] [address] [address]

## 2023-01-24 NOTE — PROGRESS NOTES
[facility]  Consultation Note    Chief Complaint / Reason for Consultation  PAD    History of Present Illness  Patient is a 69 y.o. male referred today by his primary care physician Dr. Delvis Wilks for right calf pain with walking. His associate medical history significant for hypertension, hyperlipidemia and tobacco abuse 10 years ago. He states for the last year or so he has discomfort in his right calf that begins at about 1000 steps. He admits that prior to [facility] he was very active but since then he has been less active. He states the activity slightly limits him but he is able to participate in all daily activity    Review of Systems     Denies fevers, chills, chest pain, shortness of breath, nausea, vomiting, hematemesis, diarrhea, constipation, melena, hematochezia, wt changes, vision problems, blindness, hearing problems, facial droop, slurred speech, extremity weakness, extremity numbness, dysuria. Past Medical History:   has a past medical history of Colon polyps, Enlarged prostate, Hypercholesterolemia, Hypertension, Hypertrophy of prostate without urinary obstruction and other lower urinary tract symptoms (LUTS), Irregular heart beat, Tobacco abuse, and Unspecified transient cerebral ischemia. Past Surgical History:   has a past surgical history that includes Mouth surgery and Colonoscopy (N/A, 4/15/2022). Medications:  Current Outpatient Medications on File Prior to Visit   Medication Sig Dispense Refill    hydroCHLOROthiazide (MICROZIDE) 12.5 MG capsule Take 1 capsule by mouth daily 90 capsule 3    clopidogrel (PLAVIX) 75 MG tablet Take 1 tablet by mouth daily 90 tablet 1    valsartan (DIOVAN) 40 MG tablet TAKE ONE TABLET BY MOUTH DAILY 90 tablet 1    clobetasol (TEMOVATE) 0.05 % cream Apply topically 1 times daily.  (Patient taking differently: Apply topically 1 times daily prn) 60 g 5    b complex vitamins capsule Take 1 capsule by mouth daily      Red Yeast Rice Extract 600 MG CAPS Take 2 capsules by mouth daily. aspirin 81 MG EC tablet Take 81 mg by mouth daily. No current facility-administered medications on file prior to visit. Allergies: Allergies   Allergen Reactions    Shellfish-Derived Products Swelling     CRAB-soft shell        Social History:   reports that he quit smoking about 7 years ago. His smoking use included cigarettes. He started smoking about 62 years ago. He has a 25.00 pack-year smoking history. He has never used smokeless tobacco. He reports that he does not currently use alcohol. He reports that he does not use drugs. Family History:  family history includes Alzheimer's Disease in his father; Heart Disease in his brother and mother; High Blood Pressure in his mother; Prostate Cancer in his father. Vital Signs  There were no vitals filed for this visit. Physical Examination  General:  no apparent distress  Psychiatric: affect appropriate  Head/Eyes/Ears/Nose/Throat:  Atraumatic, vision and hearing intact, face symmetric  Neck:  supple  Chest/Lungs: clear to auscultation bilaterally  Cardiac:  Regular rate and rhythm  Abdomen: soft, nontender  Extremities: warm and well perfused  - bilateral upper extremity motorsensory intact  - bilateral lower extremity motorsensory intact  Vascular exam:  1+ right and 2+ left      Labs  Lab Results   Component Value Date/Time    WBC 5.1 07/18/2022 08:41 AM    HGB 13.2 07/18/2022 08:41 AM    HCT 38.5 07/18/2022 08:41 AM    MCV 96.6 07/18/2022 08:41 AM     07/18/2022 08:41 AM     Lab Results   Component Value Date/Time     05/09/2022 07:20 AM    K 4.0 05/09/2022 07:20 AM    CL 97 05/09/2022 07:20 AM    CO2 24 05/09/2022 07:20 AM    BUN 8 05/09/2022 07:20 AM    CREATININE 0.7 05/09/2022 07:20 AM      No results found for: GLU    Imaging:        Right Impression   1.  The ankle/brachial index is 0.83.   2. Pulse volume recording at the ankle is mildly abnormal.   3. Continuous waveforms at the dorsalis pedis and posterior tibial arteries   are biphasic. 4. The great toe pressure is 94 mmHg. Left Impression   1. The ankle/brachial index is 1.16.   2. Pulse volume recording at the ankle is normal.   3. Continuous waveforms at the dorsalis pedis and posterior tibial arteries   are normal and multiphasic. 4. The great toe pressure is 135 mmHg. 5. Compared to a segmental physiologic exam on 6/24/13, the right MADI was 1.18   and the left MADI was 1.16. No orders to display     No results found. Assessment:   Atherosclerosis with right leg claudication none lifestyle limiting  History of tobacco abuse quit 10 years ago  Hypertension  Hyperlipidemia      Plan:  The patient has stable, non disabling,mildly lifestyle limiting claudication of the right lower extremity. I discussed the natural history of PAD. I reviewed the risk factors which include smoking, hypertension, dyslipidemia, diabetes and age. I have discussed the options for treatment explaining the risk/reward ratio of each option. These include regular exercise program,medication(statins,Plavix,Pletal),therapeutic angiography with endovascular revascularization , open surgical revascularization. I have answered all questions pertaining to the patient's condition. Time spent counseling 25 minutes. Patient will pursue exercise program in addition to cilostazol and return in 3 months. Joann Zendejas M.D., FACS.  1/24/2023  8:14 AM

## 2023-02-22 ENCOUNTER — TELEPHONE (OUTPATIENT)
Dept: FAMILY MEDICINE CLINIC | Age: 78
End: 2023-02-22

## 2023-02-22 NOTE — TELEPHONE ENCOUNTER
Vasc surg prescribed cilostazol but pt is already on doxazosin. Pt unsure which medication to continue. Please advise.

## 2023-02-22 NOTE — TELEPHONE ENCOUNTER
These are 2 different chronic medications.   The doxazosin is used for BPH problems and blood pressure and the pletal or cilostazol is for circulation of the legs

## 2023-02-22 NOTE — TELEPHONE ENCOUNTER
cilostazol (PLETAL) 100 MG tablet [[id number]]     Order Details  Dose: 100 mg Route: Oral Frequency: 2 TIMES DAILY   Dispense Quantity: 60 tablet Refills: 3          Sig: Take 1 tablet by mouth 2 times daily       Darrick Cid [id number] [place], [place] - [address], [address] [place] [address]   Phone:  [phone]  Fax:  [phone]

## 2023-02-22 NOTE — TELEPHONE ENCOUNTER
He is in the right that both of these medications are blood thinners-but work differently.   Would recommend he maintain both the Plavix and the Pletal

## 2023-02-27 ENCOUNTER — TELEPHONE (OUTPATIENT)
Dept: FAMILY MEDICINE CLINIC | Age: 78
End: 2023-02-27

## 2023-02-27 NOTE — TELEPHONE ENCOUNTER
----- Message from Deepalimarquise Boucher sent at 2/27/2023  9:47 AM EST -----  Subject: Appointment Request    Reason for Call: Established Patient Appointment needed: Routine Pre-Op    QUESTIONS    Reason for appointment request? Available appointments did not meet   patient need     Additional Information for Provider? pt needs a pre-op for Cataract   surgery he is having on 3.15 at [facility]. EKG needed.    ---------------------------------------------------------------------------  --------------  Sixto CHÁVEZ  [phone]; OK to leave message on voicemail  ---------------------------------------------------------------------------  --------------  SCRIPT ANSWERS  COVID Screen: Uriah Hernandez

## 2023-03-06 ENCOUNTER — OFFICE VISIT (OUTPATIENT)
Dept: FAMILY MEDICINE CLINIC | Age: 78
End: 2023-03-06
Payer: MEDICARE

## 2023-03-06 VITALS
WEIGHT: 201.5 LBS | SYSTOLIC BLOOD PRESSURE: 112 MMHG | OXYGEN SATURATION: 97 % | BODY MASS INDEX: 28.1 KG/M2 | RESPIRATION RATE: 12 BRPM | TEMPERATURE: 98.4 F | HEART RATE: 83 BPM | DIASTOLIC BLOOD PRESSURE: 64 MMHG

## 2023-03-06 DIAGNOSIS — Z01.818 PREOP EXAMINATION: ICD-10-CM

## 2023-03-06 DIAGNOSIS — H90.3 SENSORINEURAL HEARING LOSS (SNHL) OF BOTH EARS: ICD-10-CM

## 2023-03-06 DIAGNOSIS — H25.9 AGE-RELATED CATARACT OF BOTH EYES, UNSPECIFIED AGE-RELATED CATARACT TYPE: Primary | ICD-10-CM

## 2023-03-06 DIAGNOSIS — I10 PRIMARY HYPERTENSION: ICD-10-CM

## 2023-03-06 DIAGNOSIS — G45.9 TIA (TRANSIENT ISCHEMIC ATTACK): ICD-10-CM

## 2023-03-06 PROCEDURE — 99213 OFFICE O/P EST LOW 20 MIN: CPT | Performed by: FAMILY MEDICINE

## 2023-03-06 PROCEDURE — G8484 FLU IMMUNIZE NO ADMIN: HCPCS | Performed by: FAMILY MEDICINE

## 2023-03-06 PROCEDURE — 1036F TOBACCO NON-USER: CPT | Performed by: FAMILY MEDICINE

## 2023-03-06 PROCEDURE — 3078F DIAST BP <80 MM HG: CPT | Performed by: FAMILY MEDICINE

## 2023-03-06 PROCEDURE — G8427 DOCREV CUR MEDS BY ELIG CLIN: HCPCS | Performed by: FAMILY MEDICINE

## 2023-03-06 PROCEDURE — 3288F FALL RISK ASSESSMENT DOCD: CPT | Performed by: FAMILY MEDICINE

## 2023-03-06 PROCEDURE — 1123F ACP DISCUSS/DSCN MKR DOCD: CPT | Performed by: FAMILY MEDICINE

## 2023-03-06 PROCEDURE — 3074F SYST BP LT 130 MM HG: CPT | Performed by: FAMILY MEDICINE

## 2023-03-06 PROCEDURE — G8417 CALC BMI ABV UP PARAM F/U: HCPCS | Performed by: FAMILY MEDICINE

## 2023-03-06 RX ORDER — DICLOFENAC SODIUM 1 MG/ML
SOLUTION/ DROPS OPHTHALMIC
COMMUNITY
Start: 2023-02-20

## 2023-03-06 RX ORDER — PREDNISOLONE ACETATE 10 MG/ML
SUSPENSION/ DROPS OPHTHALMIC
COMMUNITY
Start: 2023-02-20

## 2023-03-06 RX ORDER — CIPROFLOXACIN HYDROCHLORIDE 3.5 MG/ML
SOLUTION/ DROPS TOPICAL
COMMUNITY
Start: 2023-02-20

## 2023-03-06 RX ORDER — DOXAZOSIN 2 MG/1
TABLET ORAL
COMMUNITY
Start: 2023-01-22

## 2023-03-06 SDOH — ECONOMIC STABILITY: FOOD INSECURITY: WITHIN THE PAST 12 MONTHS, YOU WORRIED THAT YOUR FOOD WOULD RUN OUT BEFORE YOU GOT MONEY TO BUY MORE.: NEVER TRUE

## 2023-03-06 SDOH — ECONOMIC STABILITY: FOOD INSECURITY: WITHIN THE PAST 12 MONTHS, THE FOOD YOU BOUGHT JUST DIDN'T LAST AND YOU DIDN'T HAVE MONEY TO GET MORE.: NEVER TRUE

## 2023-03-06 SDOH — ECONOMIC STABILITY: INCOME INSECURITY: HOW HARD IS IT FOR YOU TO PAY FOR THE VERY BASICS LIKE FOOD, HOUSING, MEDICAL CARE, AND HEATING?: NOT HARD AT ALL

## 2023-03-06 SDOH — ECONOMIC STABILITY: HOUSING INSECURITY
IN THE LAST 12 MONTHS, WAS THERE A TIME WHEN YOU DID NOT HAVE A STEADY PLACE TO SLEEP OR SLEPT IN A SHELTER (INCLUDING NOW)?: NO

## 2023-03-06 ASSESSMENT — PATIENT HEALTH QUESTIONNAIRE - PHQ9
SUM OF ALL RESPONSES TO PHQ QUESTIONS 1-9: 0
SUM OF ALL RESPONSES TO PHQ9 QUESTIONS 1 & 2: 0
SUM OF ALL RESPONSES TO PHQ QUESTIONS 1-9: 0
2. FEELING DOWN, DEPRESSED OR HOPELESS: 0
1. LITTLE INTEREST OR PLEASURE IN DOING THINGS: 0
SUM OF ALL RESPONSES TO PHQ QUESTIONS 1-9: 0
SUM OF ALL RESPONSES TO PHQ QUESTIONS 1-9: 0

## 2023-03-06 NOTE — PROGRESS NOTES
Preoperative Consultation    [facility]  YOB: 1945    This patient presents to the office today for a preoperative consultation at the request of surgeon, Dr. Armando Motley, who plans on performing cataract surgery on March 15 at [facility]. Planned anesthesia: Local and Topical anesthesia   Known anesthesia problems: None   Bleeding risk: No recent or remote history of abnormal bleeding  Personal or FH of DVT/PE: No      Patient Active Problem List   Diagnosis    TIA (transient ischemic attack)    Hypertension    Benign non-nodular prostatic hyperplasia with lower urinary tract symptoms    Hypercholesterolemia    Primary osteoarthritis involving multiple joints    Sensorineural hearing loss (SNHL) of both ears     Past Surgical History:   Procedure Laterality Date    COLONOSCOPY N/A 4/15/2022    COLONOSCOPY POLYPECTOMY SNARE/COLD BIOPSY performed by Ajit Espinosa MD at [address] [place] [address]         Allergies   Allergen Reactions    Shellfish-Derived Products Swelling     CRAB-soft shell     Outpatient Medications Marked as Taking for the 3/6/23 encounter (Office Visit) with Briana Gardner MD   Medication Sig Dispense Refill    ciprofloxacin (CILOXAN) 0.3 % ophthalmic solution       diclofenac (VOLTAREN) 0.1 % ophthalmic solution       doxazosin (CARDURA) 2 MG tablet       prednisoLONE acetate (PRED FORTE) 1 % ophthalmic suspension       cilostazol (PLETAL) 100 MG tablet Take 1 tablet by mouth 2 times daily 60 tablet 3    hydroCHLOROthiazide (MICROZIDE) 12.5 MG capsule Take 1 capsule by mouth daily 90 capsule 3    clopidogrel (PLAVIX) 75 MG tablet Take 1 tablet by mouth daily 90 tablet 1    valsartan (DIOVAN) 40 MG tablet TAKE ONE TABLET BY MOUTH DAILY 90 tablet 1    clobetasol (TEMOVATE) 0.05 % cream Apply topically 1 times daily.  (Patient taking differently: Apply topically 1 times daily prn) 60 g 5    b complex vitamins capsule Take 1 capsule by mouth daily      Red Yeast Rice Extract 600 MG CAPS Take 2 capsules by mouth daily.      aspirin 81 MG EC tablet Take 81 mg by mouth daily.           Social History     Tobacco Use    Smoking status: Former     Packs/day: 0.50     Years: 50.00     Pack years: 25.00     Types: Cigarettes     Start date:      Quit date: 2015     Years since quittin.5    Smokeless tobacco: Never   Substance Use Topics    Alcohol use: Not Currently     Family History   Problem Relation Age of Onset    High Blood Pressure Mother     Heart Disease Mother     Alzheimer's Disease Father     Prostate Cancer Father     Heart Disease Brother        Review of Systems  A comprehensive review of systems was negative except for what was noted in the HPI.     Physical Exam   /64 (Site: Right Upper Arm, Position: Sitting, Cuff Size: Large Adult)   Pulse 83   Temp 98.4 °F (36.9 °C) (Infrared)   Resp 12   Wt 201 lb 8 oz (91.4 kg)   SpO2 97%   BMI 28.10 kg/m²   Weight: 201 lb 8 oz (91.4 kg)   Constitutional: He is oriented to person, place, and time. He appears well-developed and well-nourished. No distress.   HENT:   Head: Normocephalic and atraumatic.   Mouth/Throat: Uvula is midline, oropharynx is clear and moist and mucous membranes are normal.   Eyes: Conjunctivae and EOM are normal. Pupils are equal, round, and reactive to light. Bilat cataract  Neck: Trachea normal and normal range of motion. Neck supple. No JVD present. Carotid bruit is not present. No mass and no thyromegaly present.   Cardiovascular: Normal rate, regular rhythm, normal heart sounds and intact distal pulses.  Exam reveals no gallop and no friction rub.  No murmur heard.  Pulmonary/Chest: Effort normal and breath sounds normal. No respiratory distress. He has no wheezes. He has no rales.   Abdominal: Soft. Normal aorta and bowel sounds are normal. He exhibits no distension and no mass. There is no hepatosplenomegaly. No tenderness.   Musculoskeletal: He exhibits no edema and no  tenderness. Neurological: He is alert and oriented to person, place, and time. He has normal strength. No cranial nerve deficit or sensory deficit. Coordination and gait normal.   Skin: Skin is warm and dry. No rash noted. No erythema. Lab Review No       Assessment:       Cyndee De Leon was seen today for pre-op exam.    Diagnoses and all orders for this visit:    Age-related cataract of both eyes, unspecified age-related cataract type    Preop examination    Primary hypertension    Sensorineural hearing loss (SNHL) of both ears    TIA (transient ischemic attack)    69 y.o. patient  approved for Surgery         Plan:     1. Preoperative workup as follows: none  2. Change in medication regimen before surgery: Hold all medications on morning of surgery  3. No contraindications to planned surgery  4. Medical decision making of moderate complexity. Note electronically signed by provider.

## 2023-04-24 RX ORDER — CLOPIDOGREL BISULFATE 75 MG/1
TABLET ORAL
Qty: 90 TABLET | Refills: 1 | Status: SHIPPED | OUTPATIENT
Start: 2023-04-24

## 2023-04-24 NOTE — TELEPHONE ENCOUNTER
Medication:   Requested Prescriptions     Pending Prescriptions Disp Refills    clopidogrel (PLAVIX) 75 MG tablet [Pharmacy Med Name: CLOPIDOGREL 75 MG TABLET] 90 tablet 1     Sig: TAKE ONE TABLET BY MOUTH DAILY      Last Filled:      Patient Phone Number: [phone] (home)     Last appt: 3/6/2023   Next appt: Visit date not found    Last OARRS: No flowsheet data found.   PDMP Monitoring:    Last PDMP Dmitry Jimenez as Reviewed [facility]):  Review User Review Instant Review Result          Preferred Pharmacy:   [facility]  [id number]  [address] [place] [address]  Phone: [phone] Fax: [phone]

## 2023-04-25 ENCOUNTER — OFFICE VISIT (OUTPATIENT)
Dept: VASCULAR SURGERY | Age: 78
End: 2023-04-25
Payer: MEDICARE

## 2023-04-25 VITALS
WEIGHT: 204 LBS | BODY MASS INDEX: 28.56 KG/M2 | SYSTOLIC BLOOD PRESSURE: 140 MMHG | DIASTOLIC BLOOD PRESSURE: 78 MMHG | HEIGHT: 71 IN

## 2023-04-25 DIAGNOSIS — I70.211 ATHEROSCLEROSIS OF NATIVE ARTERIES OF EXTREMITIES WITH INTERMITTENT CLAUDICATION, RIGHT LEG (HCC): Primary | ICD-10-CM

## 2023-04-25 PROCEDURE — 99212 OFFICE O/P EST SF 10 MIN: CPT | Performed by: SURGERY

## 2023-04-25 PROCEDURE — 3077F SYST BP >= 140 MM HG: CPT | Performed by: SURGERY

## 2023-04-25 PROCEDURE — 1123F ACP DISCUSS/DSCN MKR DOCD: CPT | Performed by: SURGERY

## 2023-04-25 PROCEDURE — 1036F TOBACCO NON-USER: CPT | Performed by: SURGERY

## 2023-04-25 PROCEDURE — G8427 DOCREV CUR MEDS BY ELIG CLIN: HCPCS | Performed by: SURGERY

## 2023-04-25 PROCEDURE — G8417 CALC BMI ABV UP PARAM F/U: HCPCS | Performed by: SURGERY

## 2023-04-25 PROCEDURE — 3078F DIAST BP <80 MM HG: CPT | Performed by: SURGERY

## 2023-04-25 NOTE — PROGRESS NOTES
[facility])   Vascular Surgery Followup    Referring Provider:  Dominic Dubon MD     No chief complaint on file. History of Present Illness:  80-year-old male here today for follow-up of lower extremity claudication. He has a history of hypertension, hyperlipidemia and long-term tobacco abuse. He was placed on Pletal and an exercise program 3 months ago and is here today to monitor his progress. He has noted marked improvement in the discomfort in his right calf with activity. He is able to walk a mile before the onset of symptoms. No other new complaints    Past Medical History:   has a past medical history of Colon polyps, Enlarged prostate, Hypercholesterolemia, Hypertension, Hypertrophy of prostate without urinary obstruction and other lower urinary tract symptoms (LUTS), Irregular heart beat, Tobacco abuse, and Unspecified transient cerebral ischemia. Surgical History:   has a past surgical history that includes Mouth surgery and Colonoscopy (N/A, 4/15/2022). Social History:   reports that he quit smoking about 7 years ago. His smoking use included cigarettes. He started smoking about 62 years ago. He has a 25.00 pack-year smoking history. He has never used smokeless tobacco. He reports that he does not currently use alcohol. He reports that he does not use drugs. Family History:  family history includes Alzheimer's Disease in his father; Heart Disease in his brother and mother; High Blood Pressure in his mother; Prostate Cancer in his father.      Home Medications:  Current Outpatient Medications   Medication Sig Dispense Refill    clopidogrel (PLAVIX) 75 MG tablet TAKE ONE TABLET BY MOUTH DAILY 90 tablet 1    valsartan (DIOVAN) 40 MG tablet TAKE ONE TABLET BY MOUTH DAILY 90 tablet 0    ciprofloxacin (CILOXAN) 0.3 % ophthalmic solution       diclofenac (VOLTAREN) 0.1 % ophthalmic solution       doxazosin (CARDURA) 2 MG tablet       prednisoLONE acetate (PRED FORTE) 1 % ophthalmic

## 2023-05-20 DIAGNOSIS — I70.211 ATHEROSCLEROSIS OF NATIVE ARTERIES OF EXTREMITIES WITH INTERMITTENT CLAUDICATION, RIGHT LEG (HCC): Primary | ICD-10-CM

## 2023-05-22 RX ORDER — CILOSTAZOL 100 MG/1
TABLET ORAL
Qty: 60 TABLET | Refills: 3 | Status: SHIPPED | OUTPATIENT
Start: 2023-05-22

## 2023-06-24 ENCOUNTER — HOSPITAL ENCOUNTER (EMERGENCY)
Age: 78
Discharge: HOME | End: 2023-06-24
Attending: EMERGENCY MEDICINE
Payer: MEDICARE

## 2023-06-24 ENCOUNTER — APPOINTMENT (OUTPATIENT)
Dept: GENERAL RADIOLOGY | Age: 78
End: 2023-06-24
Payer: MEDICARE

## 2023-06-24 VITALS
SYSTOLIC BLOOD PRESSURE: 150 MMHG | WEIGHT: 199 LBS | RESPIRATION RATE: 16 BRPM | BODY MASS INDEX: 27.86 KG/M2 | DIASTOLIC BLOOD PRESSURE: 86 MMHG | TEMPERATURE: 98.6 F | OXYGEN SATURATION: 97 % | HEIGHT: 71 IN | HEART RATE: 86 BPM

## 2023-06-24 DIAGNOSIS — L08.9 LOCAL INFECTION OF SKIN AND SUBCUTANEOUS TISSUE: ICD-10-CM
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
## 2023-06-24 DIAGNOSIS — S61.451A CAT BITE OF RIGHT HAND, INITIAL ENCOUNTER: Primary | ICD-10-CM

## 2023-06-24 PROCEDURE — 73120 X-RAY EXAM OF HAND: CPT

## 2023-06-24 PROCEDURE — 6370000000 HC RX 637 (ALT 250 FOR IP): Performed by: EMERGENCY MEDICINE

## 2023-06-24 PROCEDURE — 90471 IMMUNIZATION ADMIN: CPT | Performed by: EMERGENCY MEDICINE

## 2023-06-24 PROCEDURE — 6360000002 HC RX W HCPCS: Performed by: EMERGENCY MEDICINE

## 2023-06-24 PROCEDURE — 99284 EMERGENCY DEPT VISIT MOD MDM: CPT

## 2023-06-24 PROCEDURE — 90714 TD VACC NO PRESV 7 YRS+ IM: CPT | Performed by: EMERGENCY MEDICINE

## 2023-06-24 RX ORDER — AMOXICILLIN AND CLAVULANATE POTASSIUM 875; 125 MG/1; MG/1
1 TABLET, FILM COATED ORAL 2 TIMES DAILY
Qty: 20 TABLET | Refills: 0 | Status: SHIPPED | OUTPATIENT
Start: 2023-06-24 | End: 2023-07-04

## 2023-06-24 RX ORDER — AMOXICILLIN AND CLAVULANATE POTASSIUM 875; 125 MG/1; MG/1
1 TABLET, FILM COATED ORAL ONCE
Status: COMPLETED | OUTPATIENT
Start: 2023-06-24 | End: 2023-06-24

## 2023-06-24 RX ADMIN — AMOXICILLIN AND CLAVULANATE POTASSIUM 1 TABLET: 875; 125 TABLET, FILM COATED ORAL at 08:30

## 2023-06-24 RX ADMIN — CLOSTRIDIUM TETANI TOXOID ANTIGEN (FORMALDEHYDE INACTIVATED) AND CORYNEBACTERIUM DIPHTHERIAE TOXOID ANTIGEN (FORMALDEHYDE INACTIVATED) 0.5 ML: 5; 2 INJECTION, SUSPENSION INTRAMUSCULAR at 08:30

## 2023-06-24 ASSESSMENT — PAIN - FUNCTIONAL ASSESSMENT: PAIN_FUNCTIONAL_ASSESSMENT: NONE - DENIES PAIN

## 2023-06-24 NOTE — ED NOTES
Pt verbalizes understanding of discharge instructions, denies need for wheelchair, ambulated to exit with steady gait and belongings in tow.       Wilfredo Swanson RN  [phone]

## 2023-07-24 RX ORDER — DOXAZOSIN 2 MG/1
TABLET ORAL
Qty: 90 TABLET | OUTPATIENT
Start: 2023-07-24

## 2023-07-24 RX ORDER — VALSARTAN 40 MG/1
TABLET ORAL
Qty: 30 TABLET | Refills: 0 | Status: SHIPPED | OUTPATIENT
Start: 2023-07-24

## 2023-07-31 RX ORDER — DOXAZOSIN 2 MG/1
TABLET ORAL
Qty: 90 TABLET | OUTPATIENT
Start: 2023-07-31

## 2023-08-03 RX ORDER — DOXAZOSIN 2 MG/1
TABLET ORAL
Qty: 90 TABLET | OUTPATIENT
Start: 2023-08-03

## 2023-08-04 RX ORDER — DOXAZOSIN 2 MG/1
2 TABLET ORAL NIGHTLY
Qty: 30 TABLET | Refills: 0 | Status: SHIPPED | OUTPATIENT
Start: 2023-08-04

## 2023-08-21 RX ORDER — DOXAZOSIN 2 MG/1
TABLET ORAL
Qty: 30 TABLET | Refills: 0 | Status: SHIPPED | OUTPATIENT
Start: 2023-08-21

## 2023-08-21 RX ORDER — VALSARTAN 40 MG/1
TABLET ORAL
Qty: 30 TABLET | Refills: 0 | Status: SHIPPED | OUTPATIENT
Start: 2023-08-21

## 2023-09-05 ENCOUNTER — OFFICE VISIT (OUTPATIENT)
Dept: FAMILY MEDICINE CLINIC | Age: 78
End: 2023-09-05

## 2023-09-05 VITALS
HEART RATE: 91 BPM | OXYGEN SATURATION: 97 % | SYSTOLIC BLOOD PRESSURE: 110 MMHG | BODY MASS INDEX: 28.61 KG/M2 | WEIGHT: 205.13 LBS | DIASTOLIC BLOOD PRESSURE: 60 MMHG | TEMPERATURE: 98.9 F | RESPIRATION RATE: 12 BRPM

## 2023-09-05 DIAGNOSIS — I10 PRIMARY HYPERTENSION: Primary | ICD-10-CM

## 2023-09-05 DIAGNOSIS — R53.83 FATIGUE, UNSPECIFIED TYPE: ICD-10-CM

## 2023-09-05 DIAGNOSIS — L72.3 SEBACEOUS CYST: ICD-10-CM

## 2023-09-05 DIAGNOSIS — E78.00 HYPERCHOLESTEROLEMIA: ICD-10-CM

## 2023-09-05 DIAGNOSIS — I70.211 ATHEROSCLEROSIS OF NATIVE ARTERIES OF EXTREMITIES WITH INTERMITTENT CLAUDICATION, RIGHT LEG (HCC): ICD-10-CM

## 2023-09-05 RX ORDER — DOXAZOSIN 2 MG/1
2 TABLET ORAL NIGHTLY
Qty: 90 TABLET | Refills: 1 | Status: SHIPPED | OUTPATIENT
Start: 2023-09-05

## 2023-09-05 RX ORDER — VALSARTAN 40 MG/1
40 TABLET ORAL DAILY
Qty: 90 TABLET | Refills: 1 | Status: SHIPPED | OUTPATIENT
Start: 2023-09-05

## 2023-09-05 RX ORDER — CLOPIDOGREL BISULFATE 75 MG/1
75 TABLET ORAL DAILY
Qty: 90 TABLET | Refills: 1 | Status: SHIPPED | OUTPATIENT
Start: 2023-09-05

## 2023-09-05 RX ORDER — CILOSTAZOL 100 MG/1
100 TABLET ORAL 2 TIMES DAILY
Qty: 180 TABLET | Refills: 1 | Status: SHIPPED | OUTPATIENT
Start: 2023-09-05

## 2023-09-05 NOTE — PROGRESS NOTES
Subjective:      Patient ID: Fatemeh Jane is a 69 y.o. male. CC: Patient presents for re-evaluation of chronic health problems including hypertension, fatigue, claudication and skin lesion. HPI pt is here for a follow up, med refill. Patient states he just does not have the same energy and stamina that he had before. He is not walking as much as he was although his claudication symptoms have significantly improved with medical management including both the Plavix and Pletal medication. He does have a skin lesion on the left side of his nose that he was concerned about. He is not on cholesterol management other than taking red yeast rice per his own request.    Review of Systems  Patient Active Problem List   Diagnosis    TIA (transient ischemic attack)    Hypertension    Benign non-nodular prostatic hyperplasia with lower urinary tract symptoms    Hypercholesterolemia    Primary osteoarthritis involving multiple joints    Sensorineural hearing loss (SNHL) of both ears       Outpatient Medications Marked as Taking for the 9/5/23 encounter (Office Visit) with Yasmine Sahu MD   Medication Sig Dispense Refill    valsartan (DIOVAN) 40 MG tablet Take 1 tablet by mouth daily 90 tablet 1    doxazosin (CARDURA) 2 MG tablet Take 1 tablet by mouth nightly 90 tablet 1    cilostazol (PLETAL) 100 MG tablet Take 1 tablet by mouth 2 times daily 180 tablet 1    clopidogrel (PLAVIX) 75 MG tablet Take 1 tablet by mouth daily 90 tablet 1    hydroCHLOROthiazide (MICROZIDE) 12.5 MG capsule Take 1 capsule by mouth daily 90 capsule 3    clobetasol (TEMOVATE) 0.05 % cream Apply topically 1 times daily. (Patient taking differently: Apply topically 1 times daily prn) 60 g 5    Red Yeast Rice Extract 600 MG CAPS Take 2 capsules by mouth daily.       aspirin 81 MG EC tablet Take 1 tablet by mouth daily         Allergies   Allergen Reactions    Shellfish-Derived Products Swelling     CRAB-soft shell       Social History

## 2023-09-11 ENCOUNTER — HOSPITAL ENCOUNTER (OUTPATIENT)
Age: 78
Discharge: HOME | End: 2023-09-11
Payer: MEDICARE

## 2023-09-11 DIAGNOSIS — E78.00 HYPERCHOLESTEROLEMIA: ICD-10-CM

## 2023-09-11 DIAGNOSIS — I10 PRIMARY HYPERTENSION: ICD-10-CM

## 2023-09-11 LAB
ALBUMIN SERPL-MCNC: 4 G/DL (ref 3.4–5)
ALBUMIN/GLOB SERPL: 1.5 {RATIO} (ref 1.1–2.2)
ALP SERPL-CCNC: 51 U/L (ref 40–129)
ALT SERPL-CCNC: 8 U/L (ref 10–40)
ANION GAP SERPL CALCULATED.3IONS-SCNC: 8 MMOL/L (ref 3–16)
AST SERPL-CCNC: 13 U/L (ref 15–37)
BILIRUB SERPL-MCNC: 0.4 MG/DL (ref 0–1)
BUN SERPL-MCNC: 10 MG/DL (ref 7–20)
CALCIUM SERPL-MCNC: 8.8 MG/DL (ref 8.3–10.6)
CHLORIDE SERPL-SCNC: 103 MMOL/L (ref 99–110)
CHOLEST SERPL-MCNC: 161 MG/DL (ref 0–199)
CO2 SERPL-SCNC: 24 MMOL/L (ref 21–32)
CREAT SERPL-MCNC: 0.8 MG/DL (ref 0.8–1.3)
DEPRECATED RDW RBC AUTO: 12.7 % (ref 12.4–15.4)
GFR SERPLBLD CREATININE-BSD FMLA CKD-EPI: >60 ML/MIN/{1.73_M2}
GLUCOSE SERPL-MCNC: 100 MG/DL (ref 70–99)
HCT VFR BLD AUTO: 35.7 % (ref 40.5–52.5)
HDLC SERPL-MCNC: 62 MG/DL (ref 40–60)
HGB BLD-MCNC: 12.2 G/DL (ref 13.5–17.5)
LDLC SERPL CALC-MCNC: 91 MG/DL
MCH RBC QN AUTO: 32.1 PG (ref 26–34)
MCHC RBC AUTO-ENTMCNC: 34.3 G/DL (ref 31–36)
MCV RBC AUTO: 93.7 FL (ref 80–100)
PLATELET # BLD AUTO: 231 K/UL (ref 135–450)
PMV BLD AUTO: 7.5 FL (ref 5–10.5)
POTASSIUM SERPL-SCNC: 3.8 MMOL/L (ref 3.5–5.1)
PROT SERPL-MCNC: 6.6 G/DL (ref 6.4–8.2)
RBC # BLD AUTO: 3.81 M/UL (ref 4.2–5.9)
SODIUM SERPL-SCNC: 135 MMOL/L (ref 136–145)
TRIGL SERPL-MCNC: 41 MG/DL (ref 0–150)
TSH SERPL DL<=0.005 MIU/L-ACNC: 1.06 UIU/ML (ref 0.27–4.2)
VLDLC SERPL CALC-MCNC: 8 MG/DL
WBC # BLD AUTO: 4.4 K/UL (ref 4–11)

## 2023-09-11 PROCEDURE — 36415 COLL VENOUS BLD VENIPUNCTURE: CPT

## 2023-09-11 PROCEDURE — 85027 COMPLETE CBC AUTOMATED: CPT

## 2023-09-11 PROCEDURE — 84443 ASSAY THYROID STIM HORMONE: CPT

## 2023-09-11 PROCEDURE — 80053 COMPREHEN METABOLIC PANEL: CPT

## 2023-09-11 PROCEDURE — 80061 LIPID PANEL: CPT

## 2024-03-11 ENCOUNTER — HOSPITAL ENCOUNTER (OUTPATIENT)
Age: 79
Discharge: HOME | End: 2024-03-11

## 2024-03-14 ENCOUNTER — OFFICE VISIT (OUTPATIENT)
Dept: FAMILY MEDICINE CLINIC | Age: 79
End: 2024-03-14

## 2024-03-14 VITALS
HEART RATE: 77 BPM | OXYGEN SATURATION: 97 % | BODY MASS INDEX: 28.5 KG/M2 | SYSTOLIC BLOOD PRESSURE: 142 MMHG | WEIGHT: 204.38 LBS | RESPIRATION RATE: 16 BRPM | TEMPERATURE: 98.1 F | DIASTOLIC BLOOD PRESSURE: 90 MMHG

## 2024-03-14 DIAGNOSIS — I70.211 ATHEROSCLEROSIS OF NATIVE ARTERIES OF EXTREMITIES WITH INTERMITTENT CLAUDICATION, RIGHT LEG (HCC): ICD-10-CM

## 2024-03-14 DIAGNOSIS — Z86.73 HISTORY OF TIA (TRANSIENT ISCHEMIC ATTACK): ICD-10-CM

## 2024-03-14 DIAGNOSIS — Z87.891 PERSONAL HISTORY OF TOBACCO USE: ICD-10-CM

## 2024-03-14 DIAGNOSIS — M15.9 PRIMARY OSTEOARTHRITIS INVOLVING MULTIPLE JOINTS: ICD-10-CM

## 2024-03-14 DIAGNOSIS — I10 PRIMARY HYPERTENSION: ICD-10-CM

## 2024-03-14 DIAGNOSIS — Z00.00 MEDICARE ANNUAL WELLNESS VISIT, SUBSEQUENT: Primary | ICD-10-CM

## 2024-03-14 RX ORDER — CILOSTAZOL 100 MG/1
100 TABLET ORAL 2 TIMES DAILY
Qty: 180 TABLET | Refills: 3 | Status: SHIPPED | OUTPATIENT
Start: 2024-03-14

## 2024-03-14 RX ORDER — CLOPIDOGREL BISULFATE 75 MG/1
75 TABLET ORAL DAILY
Qty: 90 TABLET | Refills: 3 | Status: SHIPPED | OUTPATIENT
Start: 2024-03-14

## 2024-03-14 RX ORDER — DOXAZOSIN 2 MG/1
2 TABLET ORAL NIGHTLY
Qty: 90 TABLET | Refills: 3 | Status: CANCELLED | OUTPATIENT
Start: 2024-03-14

## 2024-03-14 RX ORDER — HYDROCHLOROTHIAZIDE 12.5 MG/1
12.5 CAPSULE, GELATIN COATED ORAL DAILY
Qty: 90 CAPSULE | Refills: 3 | Status: SHIPPED | OUTPATIENT
Start: 2024-03-14

## 2024-03-14 RX ORDER — VALSARTAN 40 MG/1
40 TABLET ORAL DAILY
Qty: 90 TABLET | Refills: 3 | Status: SHIPPED | OUTPATIENT
Start: 2024-03-14

## 2024-03-14 SDOH — ECONOMIC STABILITY: FOOD INSECURITY: WITHIN THE PAST 12 MONTHS, YOU WORRIED THAT YOUR FOOD WOULD RUN OUT BEFORE YOU GOT MONEY TO BUY MORE.: NEVER TRUE

## 2024-03-14 SDOH — ECONOMIC STABILITY: FOOD INSECURITY: WITHIN THE PAST 12 MONTHS, THE FOOD YOU BOUGHT JUST DIDN'T LAST AND YOU DIDN'T HAVE MONEY TO GET MORE.: NEVER TRUE

## 2024-03-14 SDOH — ECONOMIC STABILITY: INCOME INSECURITY: HOW HARD IS IT FOR YOU TO PAY FOR THE VERY BASICS LIKE FOOD, HOUSING, MEDICAL CARE, AND HEATING?: NOT HARD AT ALL

## 2024-03-14 ASSESSMENT — LIFESTYLE VARIABLES
HOW OFTEN DURING THE LAST YEAR HAVE YOU FAILED TO DO WHAT WAS NORMALLY EXPECTED FROM YOU BECAUSE OF DRINKING: 0
HOW OFTEN DURING THE LAST YEAR HAVE YOU BEEN UNABLE TO REMEMBER WHAT HAPPENED THE NIGHT BEFORE BECAUSE YOU HAD BEEN DRINKING: 0
HOW MANY STANDARD DRINKS CONTAINING ALCOHOL DO YOU HAVE ON A TYPICAL DAY: 1 OR 2
HOW OFTEN DURING THE LAST YEAR HAVE YOU NEEDED AN ALCOHOLIC DRINK FIRST THING IN THE MORNING TO GET YOURSELF GOING AFTER A NIGHT OF HEAVY DRINKING: 0
HOW OFTEN DO YOU HAVE A DRINK CONTAINING ALCOHOL: 4 OR MORE TIMES A WEEK
HOW OFTEN DURING THE LAST YEAR HAVE YOU FOUND THAT YOU WERE NOT ABLE TO STOP DRINKING ONCE YOU HAD STARTED: 0
HOW OFTEN DURING THE LAST YEAR HAVE YOU HAD A FEELING OF GUILT OR REMORSE AFTER DRINKING: 0
HAS A RELATIVE, FRIEND, DOCTOR, OR ANOTHER HEALTH PROFESSIONAL EXPRESSED CONCERN ABOUT YOUR DRINKING OR SUGGESTED YOU CUT DOWN: 0
HAVE YOU OR SOMEONE ELSE BEEN INJURED AS A RESULT OF YOUR DRINKING: 0

## 2024-03-14 ASSESSMENT — PATIENT HEALTH QUESTIONNAIRE - PHQ9
SUM OF ALL RESPONSES TO PHQ QUESTIONS 1-9: 0
2. FEELING DOWN, DEPRESSED OR HOPELESS: 0
SUM OF ALL RESPONSES TO PHQ QUESTIONS 1-9: 0
1. LITTLE INTEREST OR PLEASURE IN DOING THINGS: 0
SUM OF ALL RESPONSES TO PHQ QUESTIONS 1-9: 0
SUM OF ALL RESPONSES TO PHQ9 QUESTIONS 1 & 2: 0
SUM OF ALL RESPONSES TO PHQ QUESTIONS 1-9: 0

## 2024-03-14 NOTE — PATIENT INSTRUCTIONS
recommended every 1-2 years to screen for glaucoma; cataracts, macular degeneration, and other eye disorders.  A preventive dental visit is recommended every 6 months.  Try to get at least 150 minutes of exercise per week or 10,000 steps per day on a pedometer .  Order or download the FREE \"Exercise & Physical Activity: Your Everyday Guide\" from [employer]. Call 1-926.227.5537 or search [employer] online.  You need 8612-1393 mg of calcium and 3785-4449 IU of vitamin D per day. It is possible to meet your calcium requirement with diet alone, but a vitamin D supplement is usually necessary to meet this goal.  When exposed to the sun, use a sunscreen that protects against both UVA and UVB radiation with an SPF of 30 or greater. Reapply every 2 to 3 hours or after sweating, drying off with a towel, or swimming.  Always wear a seat belt when traveling in a car. Always wear a helmet when riding a bicycle or motorcycle.

## 2024-03-14 NOTE — PROGRESS NOTES
Juan Ramon Valdez MD   clopidogrel (PLAVIX) 75 MG tablet Take 1 tablet by mouth daily Yes Juan Ramon Valdez MD   hydroCHLOROthiazide (MICROZIDE) 12.5 MG capsule Take 1 capsule by mouth daily Yes Juan Ramon Valdez MD   clobetasol (TEMOVATE) 0.05 % cream Apply topically 1 times daily.  Patient taking differently: Apply topically 1 times daily prn Yes Juan Ramon Valdez MD   Red Yeast Rice Extract 600 MG CAPS Take 2 capsules by mouth daily. Yes ProviderBhavik MD   aspirin 81 MG EC tablet Take 1 tablet by mouth daily Yes ProviderBhavik MD       CareTeam (Including outside providers/suppliers regularly involved in providing care):   Patient Care Team:  Juan Ramon Valdez MD as PCP - General  Juan Ramon Valdez MD as PCP - Empaneled Provider     Reviewed and updated this visit:  Tobacco  Allergies  Meds  Med Hx  Surg Hx  Soc Hx  Fam Hx

## 2024-09-10 ENCOUNTER — HOSPITAL ENCOUNTER (OUTPATIENT)
Age: 79
Discharge: HOME | End: 2024-09-10
Payer: MEDICARE

## 2024-09-10 DIAGNOSIS — I10 PRIMARY HYPERTENSION: ICD-10-CM

## 2024-09-10 LAB
ALBUMIN SERPL-MCNC: 4 G/DL (ref 3.4–5)
ALBUMIN/GLOB SERPL: 1.6 {RATIO} (ref 1.1–2.2)
ALP SERPL-CCNC: 50 U/L (ref 40–129)
ALT SERPL-CCNC: 8 U/L (ref 10–40)
ANION GAP SERPL CALCULATED.3IONS-SCNC: 10 MMOL/L (ref 3–16)
AST SERPL-CCNC: 17 U/L (ref 15–37)
BILIRUB SERPL-MCNC: 0.6 MG/DL (ref 0–1)
BUN SERPL-MCNC: 9 MG/DL (ref 7–20)
CALCIUM SERPL-MCNC: 8.4 MG/DL (ref 8.3–10.6)
CHLORIDE SERPL-SCNC: 99 MMOL/L (ref 99–110)
CHOLEST SERPL-MCNC: 165 MG/DL (ref 0–199)
CO2 SERPL-SCNC: 26 MMOL/L (ref 21–32)
CREAT SERPL-MCNC: 0.9 MG/DL (ref 0.8–1.3)
DEPRECATED RDW RBC AUTO: 12.6 % (ref 12.4–15.4)
GFR SERPLBLD CREATININE-BSD FMLA CKD-EPI: 87 ML/MIN/{1.73_M2}
GLUCOSE SERPL-MCNC: 92 MG/DL (ref 70–99)
HCT VFR BLD AUTO: 38.5 % (ref 40.5–52.5)
HDLC SERPL-MCNC: 59 MG/DL (ref 40–60)
HGB BLD-MCNC: 13.5 G/DL (ref 13.5–17.5)
LDLC SERPL CALC-MCNC: 95 MG/DL
MCH RBC QN AUTO: 33 PG (ref 26–34)
MCHC RBC AUTO-ENTMCNC: 35.1 G/DL (ref 31–36)
MCV RBC AUTO: 94 FL (ref 80–100)
PLATELET # BLD AUTO: 212 K/UL (ref 135–450)
PMV BLD AUTO: 7.7 FL (ref 5–10.5)
POTASSIUM SERPL-SCNC: 4.4 MMOL/L (ref 3.5–5.1)
PROT SERPL-MCNC: 6.5 G/DL (ref 6.4–8.2)
RBC # BLD AUTO: 4.09 M/UL (ref 4.2–5.9)
SODIUM SERPL-SCNC: 135 MMOL/L (ref 136–145)
TRIGL SERPL-MCNC: 55 MG/DL (ref 0–150)
VLDLC SERPL CALC-MCNC: 11 MG/DL
WBC # BLD AUTO: 4.8 K/UL (ref 4–11)

## 2024-09-10 PROCEDURE — 85027 COMPLETE CBC AUTOMATED: CPT

## 2024-09-10 PROCEDURE — 80061 LIPID PANEL: CPT

## 2024-09-10 PROCEDURE — 80053 COMPREHEN METABOLIC PANEL: CPT

## 2024-09-10 PROCEDURE — 36415 COLL VENOUS BLD VENIPUNCTURE: CPT

## 2024-09-17 ENCOUNTER — OFFICE VISIT (OUTPATIENT)
Dept: FAMILY MEDICINE CLINIC | Age: 79
End: 2024-09-17

## 2024-09-17 VITALS
DIASTOLIC BLOOD PRESSURE: 70 MMHG | HEART RATE: 88 BPM | RESPIRATION RATE: 12 BRPM | SYSTOLIC BLOOD PRESSURE: 102 MMHG | TEMPERATURE: 97.8 F | WEIGHT: 210.13 LBS | BODY MASS INDEX: 29.42 KG/M2 | HEIGHT: 71 IN | OXYGEN SATURATION: 97 %

## 2024-09-17 DIAGNOSIS — I73.9 PERIPHERAL ARTERIAL DISEASE (HCC): ICD-10-CM

## 2024-09-17 DIAGNOSIS — G56.03 BILATERAL CARPAL TUNNEL SYNDROME: ICD-10-CM

## 2024-09-17 DIAGNOSIS — Z86.73 HISTORY OF TIA (TRANSIENT ISCHEMIC ATTACK): ICD-10-CM

## 2024-09-17 DIAGNOSIS — M15.9 PRIMARY OSTEOARTHRITIS INVOLVING MULTIPLE JOINTS: ICD-10-CM

## 2024-09-17 DIAGNOSIS — E78.00 HYPERCHOLESTEROLEMIA: Primary | ICD-10-CM

## 2025-03-07 DIAGNOSIS — I70.211 ATHEROSCLEROSIS OF NATIVE ARTERIES OF EXTREMITIES WITH INTERMITTENT CLAUDICATION, RIGHT LEG: ICD-10-CM

## 2025-03-07 RX ORDER — CILOSTAZOL 100 MG/1
100 TABLET ORAL 2 TIMES DAILY
Qty: 180 TABLET | Refills: 0 | Status: SHIPPED | OUTPATIENT
Start: 2025-03-07

## 2025-03-10 NOTE — TELEPHONE ENCOUNTER
Medication:   Requested Prescriptions      No prescriptions requested or ordered in this encounter      Seeing Dr. Emerson 3/17    Patient Phone Number: [phone] (home)     Last appt: 9/17/2024   Next appt: 3/17/2025    Last OARRS:        No data to display              PDMP Monitoring:    Last PDMP [place] as Reviewed ([place]):  Review User Review Instant Review Result          Preferred Pharmacy:   [facility] [id number] - [place], [place] - [address] [phone] - F [phone]  [address] [place] [address]  Phone: [phone] Fax: [phone]

## 2025-03-17 ENCOUNTER — TELEPHONE (OUTPATIENT)
Dept: ORTHOPEDIC SURGERY | Age: 80
End: 2025-03-17

## 2025-03-17 ENCOUNTER — OFFICE VISIT (OUTPATIENT)
Dept: ORTHOPEDIC SURGERY | Age: 80
End: 2025-03-17

## 2025-03-17 ENCOUNTER — OFFICE VISIT (OUTPATIENT)
Dept: FAMILY MEDICINE CLINIC | Age: 80
End: 2025-03-17
Payer: MEDICARE

## 2025-03-17 VITALS
OXYGEN SATURATION: 98 % | TEMPERATURE: 98.2 F | SYSTOLIC BLOOD PRESSURE: 134 MMHG | WEIGHT: 215 LBS | DIASTOLIC BLOOD PRESSURE: 74 MMHG | BODY MASS INDEX: 30.78 KG/M2 | HEIGHT: 70 IN | HEART RATE: 91 BPM

## 2025-03-17 DIAGNOSIS — Z53.20 LUNG CANCER SCREENING DECLINED BY PATIENT: ICD-10-CM

## 2025-03-17 DIAGNOSIS — I10 PRIMARY HYPERTENSION: ICD-10-CM

## 2025-03-17 DIAGNOSIS — Z76.89 ENCOUNTER TO ESTABLISH CARE: Primary | ICD-10-CM

## 2025-03-17 DIAGNOSIS — I73.9 PERIPHERAL ARTERIAL DISEASE: ICD-10-CM

## 2025-03-17 DIAGNOSIS — M25.562 PAIN AND SWELLING OF LEFT KNEE: ICD-10-CM

## 2025-03-17 DIAGNOSIS — M25.562 LEFT KNEE PAIN, UNSPECIFIED CHRONICITY: Primary | ICD-10-CM

## 2025-03-17 DIAGNOSIS — M25.462 PAIN AND SWELLING OF LEFT KNEE: ICD-10-CM

## 2025-03-17 DIAGNOSIS — Z00.00 MEDICARE ANNUAL WELLNESS VISIT, SUBSEQUENT: ICD-10-CM

## 2025-03-17 DIAGNOSIS — Z87.891 PERSONAL HISTORY OF TOBACCO USE: ICD-10-CM

## 2025-03-17 DIAGNOSIS — E78.00 HYPERCHOLESTEROLEMIA: ICD-10-CM

## 2025-03-17 PROCEDURE — 1159F MED LIST DOCD IN RCRD: CPT | Performed by: STUDENT IN AN ORGANIZED HEALTH CARE EDUCATION/TRAINING PROGRAM

## 2025-03-17 PROCEDURE — G0439 PPPS, SUBSEQ VISIT: HCPCS | Performed by: STUDENT IN AN ORGANIZED HEALTH CARE EDUCATION/TRAINING PROGRAM

## 2025-03-17 PROCEDURE — 1036F TOBACCO NON-USER: CPT | Performed by: STUDENT IN AN ORGANIZED HEALTH CARE EDUCATION/TRAINING PROGRAM

## 2025-03-17 PROCEDURE — G0296 VISIT TO DETERM LDCT ELIG: HCPCS | Performed by: STUDENT IN AN ORGANIZED HEALTH CARE EDUCATION/TRAINING PROGRAM

## 2025-03-17 PROCEDURE — 3078F DIAST BP <80 MM HG: CPT | Performed by: STUDENT IN AN ORGANIZED HEALTH CARE EDUCATION/TRAINING PROGRAM

## 2025-03-17 PROCEDURE — G8417 CALC BMI ABV UP PARAM F/U: HCPCS | Performed by: STUDENT IN AN ORGANIZED HEALTH CARE EDUCATION/TRAINING PROGRAM

## 2025-03-17 PROCEDURE — 3075F SYST BP GE 130 - 139MM HG: CPT | Performed by: STUDENT IN AN ORGANIZED HEALTH CARE EDUCATION/TRAINING PROGRAM

## 2025-03-17 PROCEDURE — 1123F ACP DISCUSS/DSCN MKR DOCD: CPT | Performed by: STUDENT IN AN ORGANIZED HEALTH CARE EDUCATION/TRAINING PROGRAM

## 2025-03-17 PROCEDURE — G2211 COMPLEX E/M VISIT ADD ON: HCPCS | Performed by: STUDENT IN AN ORGANIZED HEALTH CARE EDUCATION/TRAINING PROGRAM

## 2025-03-17 PROCEDURE — 99214 OFFICE O/P EST MOD 30 MIN: CPT | Performed by: STUDENT IN AN ORGANIZED HEALTH CARE EDUCATION/TRAINING PROGRAM

## 2025-03-17 PROCEDURE — G8427 DOCREV CUR MEDS BY ELIG CLIN: HCPCS | Performed by: STUDENT IN AN ORGANIZED HEALTH CARE EDUCATION/TRAINING PROGRAM

## 2025-03-17 RX ORDER — TRIAMCINOLONE ACETONIDE 40 MG/ML
40 INJECTION, SUSPENSION INTRA-ARTICULAR; INTRAMUSCULAR ONCE
Status: COMPLETED | OUTPATIENT
Start: 2025-03-17 | End: 2025-03-17

## 2025-03-17 RX ORDER — LIDOCAINE HYDROCHLORIDE 10 MG/ML
4 INJECTION, SOLUTION INFILTRATION; PERINEURAL ONCE
Status: COMPLETED | OUTPATIENT
Start: 2025-03-17 | End: 2025-03-17

## 2025-03-17 RX ADMIN — TRIAMCINOLONE ACETONIDE 40 MG: 40 INJECTION, SUSPENSION INTRA-ARTICULAR; INTRAMUSCULAR at 11:26

## 2025-03-17 RX ADMIN — LIDOCAINE HYDROCHLORIDE 4 ML: 10 INJECTION, SOLUTION INFILTRATION; PERINEURAL at 11:24

## 2025-03-17 SDOH — ECONOMIC STABILITY: FOOD INSECURITY: WITHIN THE PAST 12 MONTHS, YOU WORRIED THAT YOUR FOOD WOULD RUN OUT BEFORE YOU GOT MONEY TO BUY MORE.: NEVER TRUE

## 2025-03-17 SDOH — ECONOMIC STABILITY: FOOD INSECURITY: WITHIN THE PAST 12 MONTHS, THE FOOD YOU BOUGHT JUST DIDN'T LAST AND YOU DIDN'T HAVE MONEY TO GET MORE.: NEVER TRUE

## 2025-03-17 ASSESSMENT — PATIENT HEALTH QUESTIONNAIRE - PHQ9
2. FEELING DOWN, DEPRESSED OR HOPELESS: NOT AT ALL
SUM OF ALL RESPONSES TO PHQ QUESTIONS 1-9: 0
1. LITTLE INTEREST OR PLEASURE IN DOING THINGS: NOT AT ALL
SUM OF ALL RESPONSES TO PHQ QUESTIONS 1-9: 0

## 2025-03-17 NOTE — PROGRESS NOTES
Office Note  3/17/2025  Patient Name: Abraham Alatorre  MRN: [id number] : 1945    SUBJECTIVE:     CHIEF COMPLAINT:  Chief Complaint   Patient presents with    Medicare AW       HISTORY OF PRESENT ILLNESS:  Former  patient, establishing care. He presents today with the following concerns and/or to follow up on these chronic conditions:  History of Present Illness  The patient presents for evaluation of knee pain, blood pressure management, and health maintenance.    He reports an abrupt onset of knee pain upon awakening on Friday morning, which intensifies with weight-bearing activities, resulting in a limp. He has no prior history of knee issues. His physician had previously noted a grinding sensation in the knee but did not administer any injections or conduct any radiographic examinations. He does not recall any strenuous activity on the preceding day. He attempted to alleviate the pain with aspirin, which provided minimal relief.    He monitors his blood pressure biweekly, typically recording systolic readings between 130 and 140, with diastolic values ranging from 70 to 80. He speculates that his elevated blood pressure during this visit may be due to anxiety. His current antihypertensive regimen includes valsartan and hydrochlorothiazide, supplemented with aspirin 81 mg.    He has a history of PAD for which he is prescribed Pletal. He does not experience claudication. He is not on any lipid-lowering medications but has been taking red yeast rice for approximately 20 years, which he reports as effective.    He has not undergone lung cancer screening recently and has not had any radiographic examinations since . He is a former smoker.        SOCIAL HISTORY  He is a former smoker.    MEDICATIONS  Current: Valsartan, hydrochlorothiazide, aspirin, cilostazol (Pletal), red yeast rice.       Results  Laboratory Studies  Cholesterol levels are good.       Past Medical History:  Past Medical History: 
signs or symptoms suggestive of lung cancer.  Discussed with patient the importance of compliance with yearly annual lung cancer screenings and willingness to undergo diagnosis and treatment if screening scan is positive.  In addition, the patient was counseled regarding the importance of remaining smoke free and/or total smoking cessation.    Also reviewed the following if the patient has Medicare that as of February 10, 2022, Medicare only covers LDCT screening in patients aged 50-77 with at least a 20 pack-year smoking history who currently smoke or have quit in the last 15 years

## 2025-03-17 NOTE — PATIENT INSTRUCTIONS
and/or referrals for you.  A list of these orders (if applicable) as well as your Preventive Care list are included within your After Visit Summary for your review.

## 2025-03-18 LAB
CRYSTALS FLD MICRO: NORMAL
SPECIMEN SOURCE FLD: NORMAL

## 2025-03-18 NOTE — PROGRESS NOTES
Patient: Abraham Alatorre  : 1945    MRN: [id number]    Date of Visit: 3/17/25    Attending Physician: Herman Hyman    History of Present Illness  Mr.. Alatorre is a very pleasant 79 y.o. patient with a several month history of progressive LEFT knee pain. There is no precipitating event or trauma. The pain is located predominantly in the medial and anterior aspect of the knee aggravated by weight bearing. Walking even short distances can be painful. Stair climbing is progressing becoming more difficult and painful. However, it can also awaken the patient at night. He has tried the following interventions without sustained functional improvement:    Low-impact, structured therapy/exercise program  NSAID's/Tylenol Arthritis strength    PMH/PSH:  Past Medical History:   Diagnosis Date    Colon polyps     Enlarged prostate     Hypercholesterolemia 2014    Hypertension     Hypertrophy of prostate without urinary obstruction and other lower urinary tract symptoms (LUTS) 3/5/2013    Irregular heart beat     Tobacco abuse 2011    Unspecified transient cerebral ischemia      Patient Active Problem List   Diagnosis    History of TIA (transient ischemic attack)    Hypertension    Benign non-nodular prostatic hyperplasia with lower urinary tract symptoms    Hypercholesterolemia    Primary osteoarthritis involving multiple joints    Sensorineural hearing loss (SNHL) of both ears    Bilateral carpal tunnel syndrome    Peripheral arterial disease     Past Surgical History:   Procedure Laterality Date    COLONOSCOPY N/A 04/15/2022    COLONOSCOPY POLYPECTOMY SNARE/COLD BIOPSY performed by Adria Pitts MD at [facility]    EYE SURGERY Bilateral 2023    MOUTH SURGERY             MEDS:  Scheduled Meds:  Continuous Infusions:  PRN Meds:  Current Meds:  Current Outpatient Medications:     cilostazol (PLETAL) 100 MG tablet, Take 1 tablet by mouth 2 times daily, Disp: 180 tablet, Rfl: 0

## 2025-03-20 RX ORDER — HYDROCHLOROTHIAZIDE 12.5 MG/1
12.5 CAPSULE ORAL DAILY
Qty: 90 CAPSULE | Refills: 1 | Status: SHIPPED | OUTPATIENT
Start: 2025-03-20

## 2025-04-03 RX ORDER — VALSARTAN 40 MG/1
40 TABLET ORAL DAILY
Qty: 90 TABLET | Refills: 1 | Status: SHIPPED | OUTPATIENT
Start: 2025-04-03

## 2025-05-08 RX ORDER — CLOPIDOGREL BISULFATE 75 MG/1
75 TABLET ORAL DAILY
Qty: 90 TABLET | Refills: 1 | Status: SHIPPED | OUTPATIENT
Start: 2025-05-08

## 2025-05-16 ENCOUNTER — HOSPITAL ENCOUNTER (OUTPATIENT)
Dept: GENERAL RADIOLOGY | Age: 80
Discharge: HOME | End: 2025-05-16
Payer: MEDICARE

## 2025-05-16 ENCOUNTER — RESULTS FOLLOW-UP (OUTPATIENT)
Dept: PRIMARY CARE CLINIC | Age: 80
End: 2025-05-16

## 2025-05-16 ENCOUNTER — OFFICE VISIT (OUTPATIENT)
Dept: PRIMARY CARE CLINIC | Age: 80
End: 2025-05-16
Payer: MEDICARE

## 2025-05-16 ENCOUNTER — HOSPITAL ENCOUNTER (OUTPATIENT)
Age: 80
Discharge: HOME | End: 2025-05-16
Payer: MEDICARE

## 2025-05-16 VITALS
WEIGHT: 214.2 LBS | BODY MASS INDEX: 30.73 KG/M2 | SYSTOLIC BLOOD PRESSURE: 128 MMHG | DIASTOLIC BLOOD PRESSURE: 62 MMHG | HEART RATE: 86 BPM | OXYGEN SATURATION: 96 %

## 2025-05-16 DIAGNOSIS — E78.00 HYPERCHOLESTEROLEMIA: ICD-10-CM

## 2025-05-16 DIAGNOSIS — N40.1 BENIGN NON-NODULAR PROSTATIC HYPERPLASIA WITH LOWER URINARY TRACT SYMPTOMS: ICD-10-CM

## 2025-05-16 DIAGNOSIS — I77.89 ENLARGEMENT OF ABDOMINAL AORTA: Primary | ICD-10-CM

## 2025-05-16 DIAGNOSIS — Z11.59 SCREENING FOR VIRAL DISEASE: ICD-10-CM

## 2025-05-16 DIAGNOSIS — R53.83 OTHER FATIGUE: ICD-10-CM

## 2025-05-16 DIAGNOSIS — I70.211 ATHEROSCLEROSIS OF NATIVE ARTERIES OF EXTREMITIES WITH INTERMITTENT CLAUDICATION, RIGHT LEG: ICD-10-CM

## 2025-05-16 DIAGNOSIS — Z86.73 HISTORY OF TIA (TRANSIENT ISCHEMIC ATTACK): ICD-10-CM

## 2025-05-16 DIAGNOSIS — M54.50 CHRONIC LOW BACK PAIN WITHOUT SCIATICA, UNSPECIFIED BACK PAIN LATERALITY: ICD-10-CM

## 2025-05-16 DIAGNOSIS — M15.0 PRIMARY OSTEOARTHRITIS INVOLVING MULTIPLE JOINTS: ICD-10-CM

## 2025-05-16 DIAGNOSIS — G89.29 CHRONIC LOW BACK PAIN WITHOUT SCIATICA, UNSPECIFIED BACK PAIN LATERALITY: ICD-10-CM

## 2025-05-16 DIAGNOSIS — I10 PRIMARY HYPERTENSION: Primary | ICD-10-CM

## 2025-05-16 PROCEDURE — G8417 CALC BMI ABV UP PARAM F/U: HCPCS | Performed by: INTERNAL MEDICINE

## 2025-05-16 PROCEDURE — 99204 OFFICE O/P NEW MOD 45 MIN: CPT | Performed by: INTERNAL MEDICINE

## 2025-05-16 PROCEDURE — 1159F MED LIST DOCD IN RCRD: CPT | Performed by: INTERNAL MEDICINE

## 2025-05-16 PROCEDURE — 3078F DIAST BP <80 MM HG: CPT | Performed by: INTERNAL MEDICINE

## 2025-05-16 PROCEDURE — 1160F RVW MEDS BY RX/DR IN RCRD: CPT | Performed by: INTERNAL MEDICINE

## 2025-05-16 PROCEDURE — 1123F ACP DISCUSS/DSCN MKR DOCD: CPT | Performed by: INTERNAL MEDICINE

## 2025-05-16 PROCEDURE — 72100 X-RAY EXAM L-S SPINE 2/3 VWS: CPT

## 2025-05-16 PROCEDURE — 3074F SYST BP LT 130 MM HG: CPT | Performed by: INTERNAL MEDICINE

## 2025-05-16 PROCEDURE — G8427 DOCREV CUR MEDS BY ELIG CLIN: HCPCS | Performed by: INTERNAL MEDICINE

## 2025-05-16 PROCEDURE — 1036F TOBACCO NON-USER: CPT | Performed by: INTERNAL MEDICINE

## 2025-05-16 RX ORDER — CILOSTAZOL 100 MG/1
100 TABLET ORAL 2 TIMES DAILY
Qty: 180 TABLET | Refills: 1 | Status: SHIPPED | OUTPATIENT
Start: 2025-05-16

## 2025-05-16 NOTE — PROGRESS NOTES
5/16/2025   Abraham Alatorre  1945    The patients PMH, surgical history, family history, medications, allergies were all reviewed and updated as appropriate today.     Current Outpatient Medications on File Prior to Visit   Medication Sig Dispense Refill    clopidogrel (PLAVIX) 75 MG tablet Take 1 tablet by mouth daily 90 tablet 1    valsartan (DIOVAN) 40 MG tablet Take 1 tablet by mouth daily 90 tablet 1    hydroCHLOROthiazide (MICROZIDE) 12.5 MG capsule Take 1 capsule by mouth daily 90 capsule 1    cilostazol (PLETAL) 100 MG tablet Take 1 tablet by mouth 2 times daily 180 tablet 0    clobetasol (TEMOVATE) 0.05 % cream Apply topically 1 times daily. 60 g 5    Red Yeast Rice Extract 600 MG CAPS Take 2 capsules by mouth daily.      aspirin 81 MG EC tablet Take 1 tablet by mouth daily       No current facility-administered medications on file prior to visit.      Allergies   Allergen Reactions    Shellfish-Derived Products Swelling     CRAB-soft shell         Chief Complaint   Patient presents with    Established New Doctor     Pt is here with his wife Karina for support. Per pt he is here to established care with new PCP.     Lower Back Pain     Pt c/o lower back pain x 10-20 years but it is getting worse.    Fatigue     Pt c/o sever fatigue x 6 months.        HPI:  changing care from Dr Valdez, now c/o chronic LBP x 20 years, has known DJD but doesn't take NSAID's,   pain intermittent L flank worse with movement, \"wears a brace\"    now also c/o fatigue x 6 months    Last labs were done 6 months ago so he wants labs repeated today    Needs refills on Pletal today, already has refills on Plavix    Past Medical History:   Diagnosis Date    Colon polyps     Enlarged prostate     Hypercholesterolemia 9/8/2014    Hypertension     Hypertrophy of prostate without urinary obstruction and other lower urinary tract symptoms (LUTS) 3/5/2013    Irregular heart beat     Tobacco abuse 7/19/2011    Unspecified transient

## 2025-05-16 NOTE — RESULT ENCOUNTER NOTE
Confirms arthritic changes in lumbar spine so may also need to use OTC Advil or Aleve prn back pain as well    Will also need to get CT abd with contrast to further evaluate aorta size due to suggestion of aortic enlargement on x-ray    Brando Salguero MD

## 2025-05-16 NOTE — PATIENT INSTRUCTIONS
COVID vax is recommended    RSV vax is advised at pharmacy    Consider Shingrix vaccination series of 2 shots over 2-6 months if desired for protection from shingles-check with [employer] first re: coverage before beginning series

## 2025-05-19 DIAGNOSIS — E78.00 HYPERCHOLESTEROLEMIA: ICD-10-CM

## 2025-05-19 DIAGNOSIS — Z86.73 HISTORY OF TIA (TRANSIENT ISCHEMIC ATTACK): ICD-10-CM

## 2025-05-19 DIAGNOSIS — N40.1 BENIGN NON-NODULAR PROSTATIC HYPERPLASIA WITH LOWER URINARY TRACT SYMPTOMS: ICD-10-CM

## 2025-05-19 DIAGNOSIS — I10 PRIMARY HYPERTENSION: ICD-10-CM

## 2025-05-19 DIAGNOSIS — R53.83 OTHER FATIGUE: ICD-10-CM

## 2025-05-19 DIAGNOSIS — Z11.59 SCREENING FOR VIRAL DISEASE: ICD-10-CM

## 2025-05-19 LAB
25(OH)D3 SERPL-MCNC: 27.1 NG/ML
ALBUMIN SERPL-MCNC: 4 G/DL (ref 3.4–5)
ALBUMIN/GLOB SERPL: 1.9 {RATIO} (ref 1.1–2.2)
ALP SERPL-CCNC: 58 U/L (ref 40–129)
ALT SERPL-CCNC: 13 U/L (ref 10–40)
ANION GAP SERPL CALCULATED.3IONS-SCNC: 9 MMOL/L (ref 3–16)
AST SERPL-CCNC: 16 U/L (ref 15–37)
BASOPHILS # BLD: 0 K/UL (ref 0–0.2)
BASOPHILS NFR BLD: 0.6 %
BILIRUB SERPL-MCNC: 0.5 MG/DL (ref 0–1)
BUN SERPL-MCNC: 9 MG/DL (ref 7–20)
CALCIUM SERPL-MCNC: 8.9 MG/DL (ref 8.3–10.6)
CHLORIDE SERPL-SCNC: 100 MMOL/L (ref 99–110)
CHOLEST SERPL-MCNC: 179 MG/DL (ref 0–199)
CO2 SERPL-SCNC: 28 MMOL/L (ref 21–32)
CREAT SERPL-MCNC: 0.7 MG/DL (ref 0.8–1.3)
DEPRECATED RDW RBC AUTO: 13.7 % (ref 12.4–15.4)
EOSINOPHIL # BLD: 0.1 K/UL (ref 0–0.6)
EOSINOPHIL NFR BLD: 2.3 %
EST. AVERAGE GLUCOSE BLD GHB EST-MCNC: 102.5 MG/DL
FOLATE SERPL-MCNC: 25.2 NG/ML (ref 4.78–24.2)
GFR SERPLBLD CREATININE-BSD FMLA CKD-EPI: >90 ML/MIN/{1.73_M2}
GLUCOSE P FAST SERPL-MCNC: 101 MG/DL (ref 70–99)
HBA1C MFR BLD: 5.2 %
HCT VFR BLD AUTO: 39.9 % (ref 40.5–52.5)
HCV AB SERPL QL IA: NORMAL
HDLC SERPL-MCNC: 58 MG/DL (ref 40–60)
HGB BLD-MCNC: 13.5 G/DL (ref 13.5–17.5)
IRON SATN MFR SERPL: 30 % (ref 20–50)
IRON SERPL-MCNC: 94 UG/DL (ref 59–158)
LDL CHOLESTEROL: 110 MG/DL
LYMPHOCYTES # BLD: 1.6 K/UL (ref 1–5.1)
LYMPHOCYTES NFR BLD: 37.2 %
MAGNESIUM SERPL-MCNC: 2.05 MG/DL (ref 1.8–2.4)
MCH RBC QN AUTO: 31.5 PG (ref 26–34)
MCHC RBC AUTO-ENTMCNC: 33.8 G/DL (ref 31–36)
MCV RBC AUTO: 93.3 FL (ref 80–100)
MONOCYTES # BLD: 0.4 K/UL (ref 0–1.3)
MONOCYTES NFR BLD: 9.2 %
NEUTROPHILS # BLD: 2.2 K/UL (ref 1.7–7.7)
NEUTROPHILS NFR BLD: 50.7 %
PLATELET # BLD AUTO: 242 K/UL (ref 135–450)
PMV BLD AUTO: 7.8 FL (ref 5–10.5)
POTASSIUM SERPL-SCNC: 4.1 MMOL/L (ref 3.5–5.1)
PROT SERPL-MCNC: 6.1 G/DL (ref 6.4–8.2)
PSA SERPL DL<=0.01 NG/ML-MCNC: 1.35 NG/ML (ref 0–4)
RBC # BLD AUTO: 4.27 M/UL (ref 4.2–5.9)
SODIUM SERPL-SCNC: 137 MMOL/L (ref 136–145)
TIBC SERPL-MCNC: 316 UG/DL (ref 260–445)
TRIGL SERPL-MCNC: 54 MG/DL (ref 0–150)
TSH SERPL DL<=0.005 MIU/L-ACNC: 0.7 UIU/ML (ref 0.27–4.2)
VIT B12 SERPL-MCNC: 678 PG/ML (ref 211–911)
VLDLC SERPL CALC-MCNC: 11 MG/DL
WBC # BLD AUTO: 4.4 K/UL (ref 4–11)

## 2025-05-22 ENCOUNTER — HOSPITAL ENCOUNTER (OUTPATIENT)
Dept: VASCULAR LAB | Age: 80
Discharge: HOME | End: 2025-05-24
Attending: INTERNAL MEDICINE
Payer: MEDICARE

## 2025-05-22 DIAGNOSIS — Z86.73 HISTORY OF TIA (TRANSIENT ISCHEMIC ATTACK): ICD-10-CM

## 2025-05-22 PROCEDURE — 93880 EXTRACRANIAL BILAT STUDY: CPT

## 2025-05-23 NOTE — RESULT ENCOUNTER NOTE
Labs were normal except for mildly decreased vitamin D level    Advised to take OTC vitamin D3 2000 IU daily    Brando Salguero MD

## 2025-05-25 LAB
VAS LEFT ARM BP: 130 MMHG
VAS LEFT CCA DIST EDV: 13.8 CM/S
VAS LEFT CCA DIST PSV: 75.2 CM/S
VAS LEFT CCA MID EDV: 11.3 CM/S
VAS LEFT CCA MID PSV: 51.1 CM/S
VAS LEFT CCA PROX EDV: 10.3 CM/S
VAS LEFT CCA PROX PSV: 56.5 CM/S
VAS LEFT ECA EDV: 16.5 CM/S
VAS LEFT ECA PSV: 164 CM/S
VAS LEFT ICA DIST EDV: 10.6 CM/S
VAS LEFT ICA DIST PSV: 39.6 CM/S
VAS LEFT ICA MID EDV: 19.1 CM/S
VAS LEFT ICA MID PSV: 65 CM/S
VAS LEFT ICA PROX EDV: 30.3 CM/S
VAS LEFT ICA PROX PSV: 113 CM/S
VAS LEFT ICA/CCA PSV: 1.5
VAS LEFT SUBCLAVIAN PROX PSV: 87.6 CM/S
VAS LEFT VERTEBRAL EDV: 7.22 CM/S
VAS LEFT VERTEBRAL PSV: 21.7 CM/S
VAS RIGHT ARM BP: 136 MMHG
VAS RIGHT CCA DIST EDV: 11.8 CM/S
VAS RIGHT CCA DIST PSV: 50.1 CM/S
VAS RIGHT CCA MID EDV: 17.2 CM/S
VAS RIGHT CCA MID PSV: 61.1 CM/S
VAS RIGHT CCA PROX EDV: 15.8 CM/S
VAS RIGHT CCA PROX PSV: 98.2 CM/S
VAS RIGHT ECA EDV: 10.2 CM/S
VAS RIGHT ECA PSV: 107 CM/S
VAS RIGHT ICA DIST EDV: 19 CM/S
VAS RIGHT ICA DIST PSV: 66.8 CM/S
VAS RIGHT ICA MID EDV: 19.2 CM/S
VAS RIGHT ICA MID PSV: 74.6 CM/S
VAS RIGHT ICA PROX EDV: 12.1 CM/S
VAS RIGHT ICA PROX PSV: 73.5 CM/S
VAS RIGHT ICA/CCA PSV: 1.47
VAS RIGHT SUBCLAVIAN PROX PSV: 118 CM/S
VAS RIGHT VERTEBRAL EDV: 7.04 CM/S
VAS RIGHT VERTEBRAL PSV: 22.8 CM/S

## 2025-05-30 ENCOUNTER — HOSPITAL ENCOUNTER (OUTPATIENT)
Dept: CT IMAGING | Age: 80
Discharge: HOME | End: 2025-05-30
Attending: INTERNAL MEDICINE
Payer: MEDICARE

## 2025-05-30 DIAGNOSIS — I77.89 ENLARGEMENT OF ABDOMINAL AORTA: ICD-10-CM

## 2025-05-30 PROCEDURE — 74177 CT ABD & PELVIS W/CONTRAST: CPT

## 2025-05-30 PROCEDURE — 6360000004 HC RX CONTRAST MEDICATION: Performed by: INTERNAL MEDICINE

## 2025-05-30 RX ORDER — IOPAMIDOL 755 MG/ML
75 INJECTION, SOLUTION INTRAVASCULAR
Status: COMPLETED | OUTPATIENT
Start: 2025-05-30 | End: 2025-05-30

## 2025-05-30 RX ADMIN — IOPAMIDOL 75 ML: 755 INJECTION, SOLUTION INTRAVENOUS at 16:51

## 2025-06-02 ENCOUNTER — RESULTS FOLLOW-UP (OUTPATIENT)
Dept: PRIMARY CARE CLINIC | Age: 80
End: 2025-06-02

## 2025-06-02 NOTE — RESULT ENCOUNTER NOTE
Mild prostatic enlargement and mild atherosclerotic changes seen in abd aorta    OK to f/u as planned    Brando Salguero MD

## 2025-06-13 ENCOUNTER — OFFICE VISIT (OUTPATIENT)
Dept: PRIMARY CARE CLINIC | Age: 80
End: 2025-06-13

## 2025-06-13 VITALS
SYSTOLIC BLOOD PRESSURE: 150 MMHG | HEART RATE: 78 BPM | OXYGEN SATURATION: 96 % | BODY MASS INDEX: 30.88 KG/M2 | DIASTOLIC BLOOD PRESSURE: 80 MMHG | WEIGHT: 215.2 LBS

## 2025-06-13 DIAGNOSIS — R53.83 OTHER FATIGUE: Primary | ICD-10-CM

## 2025-06-13 DIAGNOSIS — E78.00 HYPERCHOLESTEROLEMIA: ICD-10-CM

## 2025-06-13 DIAGNOSIS — I10 PRIMARY HYPERTENSION: ICD-10-CM

## 2025-06-13 DIAGNOSIS — Z86.73 HISTORY OF TIA (TRANSIENT ISCHEMIC ATTACK): ICD-10-CM

## 2025-06-13 RX ORDER — HYDROCHLOROTHIAZIDE 12.5 MG/1
12.5 CAPSULE ORAL DAILY
Qty: 90 CAPSULE | Refills: 1 | Status: SHIPPED | OUTPATIENT
Start: 2025-06-13

## 2025-06-13 RX ORDER — ACETAMINOPHEN 160 MG
2000 TABLET,DISINTEGRATING ORAL DAILY
COMMUNITY

## 2025-06-13 RX ORDER — VALSARTAN 40 MG/1
40 TABLET ORAL DAILY
Qty: 90 TABLET | Refills: 1 | Status: CANCELLED | OUTPATIENT
Start: 2025-06-13

## 2025-06-13 RX ORDER — VALSARTAN 80 MG/1
80 TABLET ORAL DAILY
Qty: 90 TABLET | Refills: 1 | Status: SHIPPED | OUTPATIENT
Start: 2025-06-13

## 2025-06-13 NOTE — PATIENT INSTRUCTIONS
RSV vax is advised at pharmacy    COVID vax is advised at pharmacy every Fall    Consider Shingrix vaccination series of 2 shots over 2-6 months if desired for protection from shingles-check with [employer] first re: coverage before beginning series    Annual flu vaccination is advised every Fall    Low salt diet is advised

## 2025-06-13 NOTE — PROGRESS NOTES
09/10/2024 07:38 AM     09/11/2023 07:32 AM    K 4.1 05/19/2025 08:01 AM    K 4.4 09/10/2024 07:38 AM    K 3.8 09/11/2023 07:32 AM     05/19/2025 08:01 AM    CL 99 09/10/2024 07:38 AM     09/11/2023 07:32 AM    CO2 28 05/19/2025 08:01 AM    CO2 26 09/10/2024 07:38 AM    CO2 24 09/11/2023 07:32 AM    BUN 9 05/19/2025 08:01 AM    BUN 9 09/10/2024 07:38 AM    BUN 10 09/11/2023 07:32 AM    CREATININE 0.7 05/19/2025 08:01 AM    CREATININE 0.9 09/10/2024 07:38 AM    CREATININE 0.8 09/11/2023 07:32 AM     Hepatic Function:   Lab Results   Component Value Date/Time    AST 16 05/19/2025 08:01 AM    AST 17 09/10/2024 07:38 AM    AST 13 09/11/2023 07:32 AM    ALT 13 05/19/2025 08:01 AM    ALT 8 09/10/2024 07:38 AM    ALT 8 09/11/2023 07:32 AM    BILITOT 0.5 05/19/2025 08:01 AM    BILITOT 0.6 09/10/2024 07:38 AM    BILITOT 0.4 09/11/2023 07:32 AM    ALKPHOS 58 05/19/2025 08:01 AM    ALKPHOS 50 09/10/2024 07:38 AM    ALKPHOS 51 09/11/2023 07:32 AM     No results found for: \"LIPASE\", \"AMYLASE\"  Lipids:   Lab Results   Component Value Date/Time    CHOL 165 09/10/2024 07:38 AM    HDL 58 05/19/2025 08:01 AM    HDL 51 03/10/2012 08:16 AM    TRIG 55 09/10/2024 07:38 AM       ASSESSMENT/PLAN    1.) Fatigue- add OTC vitamin D3 2000 IU daily  Sleep apnea testing ordered due to fatigue    Consider SSRI trial if symptoms persist    2.) TIA- stable on Pletal and Plavix    3.) HTN- increase Diovan to 80mg, continue HCTZ  F/u in 6 months with labs prior    RSV, COVID, Shingrix, and fluvax advised  F/u in 6 months with labs prior again    Brando Salguero MD      Electronically signed by Brando Salguero MD on 6/13/2025 at 8:16 AM

## 2025-07-10 ENCOUNTER — OFFICE VISIT (OUTPATIENT)
Age: 80
End: 2025-07-10

## 2025-07-10 VITALS
WEIGHT: 211.9 LBS | BODY MASS INDEX: 29.67 KG/M2 | DIASTOLIC BLOOD PRESSURE: 86 MMHG | SYSTOLIC BLOOD PRESSURE: 148 MMHG | OXYGEN SATURATION: 97 % | HEART RATE: 82 BPM | TEMPERATURE: 98.1 F | HEIGHT: 71 IN

## 2025-07-10 DIAGNOSIS — I10 ELEVATED BLOOD PRESSURE READING IN OFFICE WITH DIAGNOSIS OF HYPERTENSION: ICD-10-CM

## 2025-07-10 DIAGNOSIS — H92.01 RIGHT EAR PAIN: Primary | ICD-10-CM

## 2025-07-10 NOTE — PROGRESS NOTES
148/86   BP Site: Right Upper Arm Right Upper Arm   Patient Position: Sitting Sitting   BP Cuff Size: Medium Adult    Pulse: 82    Temp: 98.1 °F (36.7 °C)    TempSrc: Oral    SpO2: 97%    Weight: 96.1 kg (211 lb 14.4 oz)    Height: 1.803 m (5' 11\")        No results found for this visit on 07/10/25.      Objective   Physical Exam  HENT:      Right Ear: A middle ear effusion is present.      Left Ear: Tympanic membrane and ear canal normal.      Ears:      Comments: Hearing aid in left ear in place.     Nose: Nose normal.   Eyes:      Pupils: Pupils are equal, round, and reactive to light.   Cardiovascular:      Rate and Rhythm: Normal rate and regular rhythm.   Pulmonary:      Effort: Pulmonary effort is normal.      Breath sounds: Normal breath sounds.   Musculoskeletal:         General: Normal range of motion.      Cervical back: Normal range of motion.   Skin:     General: Skin is warm.   Neurological:      Mental Status: He is alert and oriented to person, place, and time.               An electronic signature was used to authenticate this note.    FELIX Padilla - CNP

## 2025-07-10 NOTE — PATIENT INSTRUCTIONS
Today’s visit was for concern that a piece of your hearing aid might be stuck in your right ear. On exam, your ear canal and eardrum looked clear with no foreign object seen. There was some fluid behind your right eardrum, which could be due to allergies, especially since you mentioned having a runny nose. You may use an over-the-counter allergy medicine such as cetirizine (Zyrtec) or loratadine (Claritin) to help with these symptoms.  Your left ear looked normal.  Your blood pressure was elevated during today’s visit. You told us you have high blood pressure and already took your medication this morning. Because of this elevated reading, we will send a referral to your primary care provider for follow-up.  Please continue to take your blood pressure medicine as prescribed. Monitor your blood pressure at home if possible. Try to reduce salt in your diet, stay hydrated, and maintain a healthy lifestyle.  Return to the clinic or seek care right away if you develop headaches, vision changes, chest pain, or shortness of breath.  If you have any new concerns or questions, please reach out.

## (undated) DEVICE — VALVE SUCTION AIR H2O SET ORCA POD + DISP

## (undated) DEVICE — BW-412T DISP COMBO CLEANING BRUSH: Brand: SINGLE USE COMBINATION CLEANING BRUSH

## (undated) DEVICE — SOLUTION IV IRRIG WATER 500ML POUR BRL ST 2F7113

## (undated) DEVICE — SNARES COLD OVAL 10MM THIN

## (undated) DEVICE — SYRINGE MED 50ML LUERLOCK TIP

## (undated) DEVICE — PROCEDURE KIT ENDOSCP CUST

## (undated) DEVICE — TRAP SPEC RETRV CLR PLAS POLYP IN LN SUCT QUIK CTCH